# Patient Record
Sex: FEMALE | Race: OTHER | NOT HISPANIC OR LATINO
[De-identification: names, ages, dates, MRNs, and addresses within clinical notes are randomized per-mention and may not be internally consistent; named-entity substitution may affect disease eponyms.]

---

## 2018-01-26 ENCOUNTER — LABORATORY RESULT (OUTPATIENT)
Age: 33
End: 2018-01-26

## 2018-01-26 ENCOUNTER — APPOINTMENT (OUTPATIENT)
Dept: OBGYN | Facility: CLINIC | Age: 33
End: 2018-01-26
Payer: MEDICAID

## 2018-01-26 ENCOUNTER — NON-APPOINTMENT (OUTPATIENT)
Age: 33
End: 2018-01-26

## 2018-01-26 ENCOUNTER — OUTPATIENT (OUTPATIENT)
Dept: OUTPATIENT SERVICES | Facility: HOSPITAL | Age: 33
LOS: 1 days | End: 2018-01-26

## 2018-01-26 VITALS
WEIGHT: 170 LBS | SYSTOLIC BLOOD PRESSURE: 104 MMHG | HEIGHT: 66 IN | BODY MASS INDEX: 27.32 KG/M2 | DIASTOLIC BLOOD PRESSURE: 68 MMHG

## 2018-01-26 DIAGNOSIS — N76.0 ACUTE VAGINITIS: ICD-10-CM

## 2018-01-26 PROCEDURE — 76817 TRANSVAGINAL US OBSTETRIC: CPT | Mod: 26

## 2018-01-26 PROCEDURE — 99213 OFFICE O/P EST LOW 20 MIN: CPT | Mod: 25

## 2018-01-27 LAB
C TRACH RRNA SPEC QL NAA+PROBE: SIGNIFICANT CHANGE UP
HBV SURFACE AG SER-ACNC: SIGNIFICANT CHANGE UP
HCT VFR BLD CALC: 33.3 % — LOW (ref 34.5–45)
HGB BLD-MCNC: 10.6 G/DL — LOW (ref 11.5–15.5)
HIV 1+2 AB+HIV1 P24 AG SERPL QL IA: SIGNIFICANT CHANGE UP
HPV HIGH+LOW RISK DNA PNL CVX: SIGNIFICANT CHANGE UP
MCHC RBC-ENTMCNC: 27.5 PG — SIGNIFICANT CHANGE UP (ref 27–34)
MCHC RBC-ENTMCNC: 31.8 GM/DL — LOW (ref 32–36)
MCV RBC AUTO: 86.5 FL — SIGNIFICANT CHANGE UP (ref 80–100)
N GONORRHOEA RRNA SPEC QL NAA+PROBE: SIGNIFICANT CHANGE UP
PLATELET # BLD AUTO: 275 K/UL — SIGNIFICANT CHANGE UP (ref 150–400)
RBC # BLD: 3.85 M/UL — SIGNIFICANT CHANGE UP (ref 3.8–5.2)
RBC # FLD: 15.4 % — HIGH (ref 10.3–14.5)
RUBV IGG SER-ACNC: 2.6 INDEX — SIGNIFICANT CHANGE UP
RUBV IGG SER-IMP: POSITIVE — SIGNIFICANT CHANGE UP
SPECIMEN SOURCE: SIGNIFICANT CHANGE UP
T PALLIDUM AB TITR SER: NEGATIVE — SIGNIFICANT CHANGE UP
TSH SERPL-MCNC: 8.43 UIU/ML — HIGH (ref 0.27–4.2)
WBC # BLD: 10.11 K/UL — SIGNIFICANT CHANGE UP (ref 3.8–10.5)
WBC # FLD AUTO: 10.11 K/UL — SIGNIFICANT CHANGE UP (ref 3.8–10.5)

## 2018-01-28 LAB
CULTURE RESULTS: SIGNIFICANT CHANGE UP
M TB TUBERC IFN-G BLD QL: 0 IU/ML — SIGNIFICANT CHANGE UP
M TB TUBERC IFN-G BLD QL: 0.05 IU/ML — SIGNIFICANT CHANGE UP
M TB TUBERC IFN-G BLD QL: NEGATIVE — SIGNIFICANT CHANGE UP
MITOGEN IGNF BCKGRD COR BLD-ACNC: >10 IU/ML — SIGNIFICANT CHANGE UP
SPECIMEN SOURCE: SIGNIFICANT CHANGE UP

## 2018-01-29 LAB
T4 AB SER-ACNC: 9.1 UG/DL — SIGNIFICANT CHANGE UP (ref 4.6–12)
T4 FREE SERPL-MCNC: 1.1 NG/DL — SIGNIFICANT CHANGE UP (ref 0.9–1.8)

## 2018-01-30 ENCOUNTER — TRANSCRIPTION ENCOUNTER (OUTPATIENT)
Age: 33
End: 2018-01-30

## 2018-01-30 LAB
CYTOLOGY SPEC DOC CYTO: SIGNIFICANT CHANGE UP
LEAD BLD-MCNC: <1 UG/DL — SIGNIFICANT CHANGE UP (ref 0–19)

## 2018-02-07 DIAGNOSIS — Z34.90 ENCOUNTER FOR SUPERVISION OF NORMAL PREGNANCY, UNSPECIFIED, UNSPECIFIED TRIMESTER: ICD-10-CM

## 2018-02-12 ENCOUNTER — APPOINTMENT (OUTPATIENT)
Dept: ANTEPARTUM | Facility: CLINIC | Age: 33
End: 2018-02-12
Payer: MEDICAID

## 2018-02-12 ENCOUNTER — OTHER (OUTPATIENT)
Age: 33
End: 2018-02-12

## 2018-02-12 ENCOUNTER — ASOB RESULT (OUTPATIENT)
Age: 33
End: 2018-02-12

## 2018-02-12 PROCEDURE — 76817 TRANSVAGINAL US OBSTETRIC: CPT

## 2018-02-12 PROCEDURE — 83655 ASSAY OF LEAD: CPT

## 2018-02-12 PROCEDURE — 76801 OB US < 14 WKS SINGLE FETUS: CPT

## 2018-02-12 PROCEDURE — 85027 COMPLETE CBC AUTOMATED: CPT

## 2018-02-12 PROCEDURE — 87389 HIV-1 AG W/HIV-1&-2 AB AG IA: CPT

## 2018-02-12 PROCEDURE — 84436 ASSAY OF TOTAL THYROXINE: CPT

## 2018-02-12 PROCEDURE — 86780 TREPONEMA PALLIDUM: CPT

## 2018-02-12 PROCEDURE — 86850 RBC ANTIBODY SCREEN: CPT

## 2018-02-12 PROCEDURE — 84439 ASSAY OF FREE THYROXINE: CPT

## 2018-02-12 PROCEDURE — 82105 ALPHA-FETOPROTEIN SERUM: CPT

## 2018-02-12 PROCEDURE — 87340 HEPATITIS B SURFACE AG IA: CPT

## 2018-02-12 PROCEDURE — 86480 TB TEST CELL IMMUN MEASURE: CPT

## 2018-02-12 PROCEDURE — 86762 RUBELLA ANTIBODY: CPT

## 2018-02-12 PROCEDURE — G0463: CPT | Mod: 25

## 2018-02-12 PROCEDURE — 87086 URINE CULTURE/COLONY COUNT: CPT

## 2018-02-12 PROCEDURE — 86900 BLOOD TYPING SEROLOGIC ABO: CPT

## 2018-02-12 PROCEDURE — 87624 HPV HI-RISK TYP POOLED RSLT: CPT

## 2018-02-12 PROCEDURE — 84443 ASSAY THYROID STIM HORMONE: CPT

## 2018-02-27 ENCOUNTER — NON-APPOINTMENT (OUTPATIENT)
Age: 33
End: 2018-02-27

## 2018-02-27 ENCOUNTER — OUTPATIENT (OUTPATIENT)
Dept: OUTPATIENT SERVICES | Facility: HOSPITAL | Age: 33
LOS: 1 days | End: 2018-02-27

## 2018-02-27 ENCOUNTER — LABORATORY RESULT (OUTPATIENT)
Age: 33
End: 2018-02-27

## 2018-02-27 ENCOUNTER — APPOINTMENT (OUTPATIENT)
Dept: OBGYN | Facility: CLINIC | Age: 33
End: 2018-02-27
Payer: MEDICAID

## 2018-02-27 VITALS — SYSTOLIC BLOOD PRESSURE: 118 MMHG | BODY MASS INDEX: 28.08 KG/M2 | WEIGHT: 174 LBS | DIASTOLIC BLOOD PRESSURE: 78 MMHG

## 2018-02-27 DIAGNOSIS — Z34.80 ENCOUNTER FOR SUPERVISION OF OTHER NORMAL PREGNANCY, UNSPECIFIED TRIMESTER: ICD-10-CM

## 2018-02-27 PROCEDURE — 99213 OFFICE O/P EST LOW 20 MIN: CPT | Mod: NC

## 2018-02-27 PROCEDURE — 81002 URINALYSIS NONAUTO W/O SCOPE: CPT | Mod: NC

## 2018-02-27 RX ORDER — FOLIC ACID/MULTIVIT,IRON,MINER 0.4MG-18MG
200 TABLET ORAL
Qty: 30 | Refills: 6 | Status: ACTIVE | COMMUNITY
Start: 2018-02-27 | End: 1900-01-01

## 2018-03-01 LAB
2ND TRIMESTER DATA: SIGNIFICANT CHANGE UP
AFP SERPL-ACNC: SIGNIFICANT CHANGE UP
B-HCG FREE SERPL-MCNC: SIGNIFICANT CHANGE UP
CLINICAL BIOCHEMIST REVIEW: SIGNIFICANT CHANGE UP
DEMOGRAPHIC DATA: SIGNIFICANT CHANGE UP
INHIBIN A SERPL-MCNC: SIGNIFICANT CHANGE UP
SCREEN-FOOTER: SIGNIFICANT CHANGE UP
SCREEN-RECOMMENDATIONS: SIGNIFICANT CHANGE UP
U ESTRIOL SERPL-SCNC: SIGNIFICANT CHANGE UP

## 2018-03-27 ENCOUNTER — NON-APPOINTMENT (OUTPATIENT)
Age: 33
End: 2018-03-27

## 2018-03-27 ENCOUNTER — OUTPATIENT (OUTPATIENT)
Dept: OUTPATIENT SERVICES | Facility: HOSPITAL | Age: 33
LOS: 1 days | End: 2018-03-27
Payer: MEDICAID

## 2018-03-27 ENCOUNTER — APPOINTMENT (OUTPATIENT)
Dept: OBGYN | Facility: CLINIC | Age: 33
End: 2018-03-27
Payer: MEDICAID

## 2018-03-27 VITALS — WEIGHT: 178 LBS | DIASTOLIC BLOOD PRESSURE: 74 MMHG | BODY MASS INDEX: 28.73 KG/M2 | SYSTOLIC BLOOD PRESSURE: 112 MMHG

## 2018-03-27 DIAGNOSIS — Z34.80 ENCOUNTER FOR SUPERVISION OF OTHER NORMAL PREGNANCY, UNSPECIFIED TRIMESTER: ICD-10-CM

## 2018-03-27 DIAGNOSIS — Z34.90 ENCOUNTER FOR SUPERVISION OF NORMAL PREGNANCY, UNSPECIFIED, UNSPECIFIED TRIMESTER: ICD-10-CM

## 2018-03-27 DIAGNOSIS — Z34.91 ENCOUNTER FOR SUPERVISION OF NORMAL PREGNANCY, UNSPECIFIED, FIRST TRIMESTER: ICD-10-CM

## 2018-03-27 PROCEDURE — 81002 URINALYSIS NONAUTO W/O SCOPE: CPT | Mod: NC

## 2018-03-27 PROCEDURE — 99213 OFFICE O/P EST LOW 20 MIN: CPT | Mod: NC

## 2018-03-27 PROCEDURE — 81002 URINALYSIS NONAUTO W/O SCOPE: CPT

## 2018-03-27 PROCEDURE — G0463: CPT

## 2018-03-28 ENCOUNTER — APPOINTMENT (OUTPATIENT)
Dept: ANTEPARTUM | Facility: CLINIC | Age: 33
End: 2018-03-28
Payer: MEDICAID

## 2018-03-28 ENCOUNTER — NON-APPOINTMENT (OUTPATIENT)
Age: 33
End: 2018-03-28

## 2018-03-28 ENCOUNTER — ASOB RESULT (OUTPATIENT)
Age: 33
End: 2018-03-28

## 2018-03-28 PROBLEM — Z34.91 ENCOUNTER FOR SUPERVISION OF NORMAL PREGNANCY IN FIRST TRIMESTER, UNSPECIFIED GRAVIDITY: Status: RESOLVED | Noted: 2018-01-26 | Resolved: 2018-03-28

## 2018-03-28 PROCEDURE — 76805 OB US >/= 14 WKS SNGL FETUS: CPT

## 2018-03-28 PROCEDURE — 76817 TRANSVAGINAL US OBSTETRIC: CPT

## 2018-04-03 DIAGNOSIS — Z34.82 ENCOUNTER FOR SUPERVISION OF OTHER NORMAL PREGNANCY, SECOND TRIMESTER: ICD-10-CM

## 2018-04-09 ENCOUNTER — ASOB RESULT (OUTPATIENT)
Age: 33
End: 2018-04-09

## 2018-04-09 ENCOUNTER — APPOINTMENT (OUTPATIENT)
Dept: ANTEPARTUM | Facility: CLINIC | Age: 33
End: 2018-04-09
Payer: MEDICAID

## 2018-04-09 PROCEDURE — 76816 OB US FOLLOW-UP PER FETUS: CPT

## 2018-04-24 ENCOUNTER — APPOINTMENT (OUTPATIENT)
Dept: OBGYN | Facility: CLINIC | Age: 33
End: 2018-04-24
Payer: MEDICAID

## 2018-04-24 ENCOUNTER — NON-APPOINTMENT (OUTPATIENT)
Age: 33
End: 2018-04-24

## 2018-04-24 ENCOUNTER — OUTPATIENT (OUTPATIENT)
Dept: OUTPATIENT SERVICES | Facility: HOSPITAL | Age: 33
LOS: 1 days | End: 2018-04-24
Payer: MEDICAID

## 2018-04-24 VITALS — SYSTOLIC BLOOD PRESSURE: 120 MMHG | WEIGHT: 182 LBS | BODY MASS INDEX: 29.38 KG/M2 | DIASTOLIC BLOOD PRESSURE: 76 MMHG

## 2018-04-24 DIAGNOSIS — Z34.80 ENCOUNTER FOR SUPERVISION OF OTHER NORMAL PREGNANCY, UNSPECIFIED TRIMESTER: ICD-10-CM

## 2018-04-24 PROCEDURE — G0463: CPT

## 2018-04-24 PROCEDURE — 99213 OFFICE O/P EST LOW 20 MIN: CPT | Mod: NC

## 2018-04-24 PROCEDURE — 81002 URINALYSIS NONAUTO W/O SCOPE: CPT

## 2018-04-24 PROCEDURE — 81002 URINALYSIS NONAUTO W/O SCOPE: CPT | Mod: NC

## 2018-05-01 DIAGNOSIS — Z34.92 ENCOUNTER FOR SUPERVISION OF NORMAL PREGNANCY, UNSPECIFIED, SECOND TRIMESTER: ICD-10-CM

## 2018-05-23 ENCOUNTER — LABORATORY RESULT (OUTPATIENT)
Age: 33
End: 2018-05-23

## 2018-05-23 ENCOUNTER — OUTPATIENT (OUTPATIENT)
Dept: OUTPATIENT SERVICES | Facility: HOSPITAL | Age: 33
LOS: 1 days | End: 2018-05-23
Payer: MEDICAID

## 2018-05-23 ENCOUNTER — APPOINTMENT (OUTPATIENT)
Dept: OBGYN | Facility: CLINIC | Age: 33
End: 2018-05-23
Payer: MEDICAID

## 2018-05-23 VITALS
WEIGHT: 187 LBS | BODY MASS INDEX: 30.05 KG/M2 | HEIGHT: 66 IN | DIASTOLIC BLOOD PRESSURE: 70 MMHG | SYSTOLIC BLOOD PRESSURE: 112 MMHG

## 2018-05-23 DIAGNOSIS — Z34.80 ENCOUNTER FOR SUPERVISION OF OTHER NORMAL PREGNANCY, UNSPECIFIED TRIMESTER: ICD-10-CM

## 2018-05-23 DIAGNOSIS — Z34.93 ENCOUNTER FOR SUPERVISION OF NORMAL PREGNANCY, UNSPECIFIED, THIRD TRIMESTER: ICD-10-CM

## 2018-05-23 DIAGNOSIS — Z30.011 ENCOUNTER FOR INITIAL PRESCRIPTION OF CONTRACEPTIVE PILLS: ICD-10-CM

## 2018-05-23 LAB
HCT VFR BLD CALC: 31.7 % — LOW (ref 34.5–45)
HGB BLD-MCNC: 10.4 G/DL — LOW (ref 11.5–15.5)
MCHC RBC-ENTMCNC: 29.9 PG — SIGNIFICANT CHANGE UP (ref 27–34)
MCHC RBC-ENTMCNC: 32.8 GM/DL — SIGNIFICANT CHANGE UP (ref 32–36)
MCV RBC AUTO: 91.1 FL — SIGNIFICANT CHANGE UP (ref 80–100)
PLATELET # BLD AUTO: 269 K/UL — SIGNIFICANT CHANGE UP (ref 150–400)
RBC # BLD: 3.48 M/UL — LOW (ref 3.8–5.2)
RBC # FLD: 14.6 % — HIGH (ref 10.3–14.5)
WBC # BLD: 11.95 K/UL — HIGH (ref 3.8–10.5)
WBC # FLD AUTO: 11.95 K/UL — HIGH (ref 3.8–10.5)

## 2018-05-23 PROCEDURE — 85027 COMPLETE CBC AUTOMATED: CPT

## 2018-05-23 PROCEDURE — G0463: CPT

## 2018-05-23 PROCEDURE — 81003 URINALYSIS AUTO W/O SCOPE: CPT

## 2018-05-23 PROCEDURE — 99213 OFFICE O/P EST LOW 20 MIN: CPT | Mod: NC

## 2018-05-23 PROCEDURE — 84436 ASSAY OF TOTAL THYROXINE: CPT

## 2018-05-23 PROCEDURE — 81003 URINALYSIS AUTO W/O SCOPE: CPT | Mod: NC,QW

## 2018-05-23 PROCEDURE — 87389 HIV-1 AG W/HIV-1&-2 AB AG IA: CPT

## 2018-05-23 PROCEDURE — 82950 GLUCOSE TEST: CPT

## 2018-05-23 PROCEDURE — 84443 ASSAY THYROID STIM HORMONE: CPT

## 2018-05-24 LAB
GLUCOSE 1H P MEAL SERPL-MCNC: 130 MG/DL — SIGNIFICANT CHANGE UP (ref 70–134)
HIV 1+2 AB+HIV1 P24 AG SERPL QL IA: SIGNIFICANT CHANGE UP
T4 AB SER-ACNC: 8.9 UG/DL — SIGNIFICANT CHANGE UP (ref 4.6–12)
TSH SERPL-MCNC: 4.84 UIU/ML — HIGH (ref 0.27–4.2)

## 2018-06-11 ENCOUNTER — APPOINTMENT (OUTPATIENT)
Dept: OBGYN | Facility: CLINIC | Age: 33
End: 2018-06-11
Payer: MEDICAID

## 2018-06-11 ENCOUNTER — OUTPATIENT (OUTPATIENT)
Dept: OUTPATIENT SERVICES | Facility: HOSPITAL | Age: 33
LOS: 1 days | End: 2018-06-11
Payer: MEDICAID

## 2018-06-11 ENCOUNTER — NON-APPOINTMENT (OUTPATIENT)
Age: 33
End: 2018-06-11

## 2018-06-11 VITALS
HEIGHT: 66 IN | WEIGHT: 190.13 LBS | DIASTOLIC BLOOD PRESSURE: 78 MMHG | BODY MASS INDEX: 30.56 KG/M2 | SYSTOLIC BLOOD PRESSURE: 118 MMHG

## 2018-06-11 DIAGNOSIS — Z34.80 ENCOUNTER FOR SUPERVISION OF OTHER NORMAL PREGNANCY, UNSPECIFIED TRIMESTER: ICD-10-CM

## 2018-06-11 DIAGNOSIS — Z34.93 ENCOUNTER FOR SUPERVISION OF NORMAL PREGNANCY, UNSPECIFIED, THIRD TRIMESTER: ICD-10-CM

## 2018-06-11 PROCEDURE — G0463: CPT

## 2018-06-11 PROCEDURE — 99213 OFFICE O/P EST LOW 20 MIN: CPT | Mod: GE

## 2018-06-13 ENCOUNTER — APPOINTMENT (OUTPATIENT)
Dept: OBGYN | Facility: CLINIC | Age: 33
End: 2018-06-13

## 2018-06-29 ENCOUNTER — APPOINTMENT (OUTPATIENT)
Dept: OBGYN | Facility: CLINIC | Age: 33
End: 2018-06-29
Payer: MEDICAID

## 2018-06-29 ENCOUNTER — NON-APPOINTMENT (OUTPATIENT)
Age: 33
End: 2018-06-29

## 2018-06-29 ENCOUNTER — OUTPATIENT (OUTPATIENT)
Dept: OUTPATIENT SERVICES | Facility: HOSPITAL | Age: 33
LOS: 1 days | End: 2018-06-29
Payer: MEDICAID

## 2018-06-29 VITALS — WEIGHT: 192 LBS | DIASTOLIC BLOOD PRESSURE: 80 MMHG | BODY MASS INDEX: 30.99 KG/M2 | SYSTOLIC BLOOD PRESSURE: 116 MMHG

## 2018-06-29 DIAGNOSIS — Z34.80 ENCOUNTER FOR SUPERVISION OF OTHER NORMAL PREGNANCY, UNSPECIFIED TRIMESTER: ICD-10-CM

## 2018-06-29 PROCEDURE — 90471 IMMUNIZATION ADMIN: CPT | Mod: NC

## 2018-06-29 PROCEDURE — 90471 IMMUNIZATION ADMIN: CPT

## 2018-06-29 PROCEDURE — 90715 TDAP VACCINE 7 YRS/> IM: CPT | Mod: NC

## 2018-06-29 PROCEDURE — 90715 TDAP VACCINE 7 YRS/> IM: CPT

## 2018-06-29 PROCEDURE — 99213 OFFICE O/P EST LOW 20 MIN: CPT | Mod: NC,25

## 2018-06-29 PROCEDURE — G0463: CPT | Mod: 25

## 2018-07-10 DIAGNOSIS — O99.013 ANEMIA COMPLICATING PREGNANCY, THIRD TRIMESTER: ICD-10-CM

## 2018-07-10 DIAGNOSIS — Z23 ENCOUNTER FOR IMMUNIZATION: ICD-10-CM

## 2018-07-13 ENCOUNTER — APPOINTMENT (OUTPATIENT)
Dept: OBGYN | Facility: CLINIC | Age: 33
End: 2018-07-13

## 2018-07-18 ENCOUNTER — OUTPATIENT (OUTPATIENT)
Dept: OUTPATIENT SERVICES | Facility: HOSPITAL | Age: 33
LOS: 1 days | End: 2018-07-18
Payer: MEDICAID

## 2018-07-18 ENCOUNTER — APPOINTMENT (OUTPATIENT)
Dept: OBGYN | Facility: CLINIC | Age: 33
End: 2018-07-18
Payer: MEDICAID

## 2018-07-18 ENCOUNTER — LABORATORY RESULT (OUTPATIENT)
Age: 33
End: 2018-07-18

## 2018-07-18 VITALS
WEIGHT: 197.13 LBS | HEIGHT: 66 IN | DIASTOLIC BLOOD PRESSURE: 80 MMHG | BODY MASS INDEX: 31.68 KG/M2 | SYSTOLIC BLOOD PRESSURE: 118 MMHG

## 2018-07-18 DIAGNOSIS — Z34.80 ENCOUNTER FOR SUPERVISION OF OTHER NORMAL PREGNANCY, UNSPECIFIED TRIMESTER: ICD-10-CM

## 2018-07-18 DIAGNOSIS — Z34.93 ENCOUNTER FOR SUPERVISION OF NORMAL PREGNANCY, UNSPECIFIED, THIRD TRIMESTER: ICD-10-CM

## 2018-07-18 PROCEDURE — 99213 OFFICE O/P EST LOW 20 MIN: CPT | Mod: NC

## 2018-07-18 PROCEDURE — 81003 URINALYSIS AUTO W/O SCOPE: CPT

## 2018-07-18 PROCEDURE — 81003 URINALYSIS AUTO W/O SCOPE: CPT | Mod: NC,QW

## 2018-07-18 PROCEDURE — G0463: CPT

## 2018-07-18 PROCEDURE — 87653 STREP B DNA AMP PROBE: CPT

## 2018-07-19 ENCOUNTER — OUTPATIENT (OUTPATIENT)
Dept: OUTPATIENT SERVICES | Facility: HOSPITAL | Age: 33
LOS: 1 days | End: 2018-07-19
Payer: MEDICAID

## 2018-07-19 DIAGNOSIS — Z01.818 ENCOUNTER FOR OTHER PREPROCEDURAL EXAMINATION: ICD-10-CM

## 2018-07-19 DIAGNOSIS — Z98.890 OTHER SPECIFIED POSTPROCEDURAL STATES: ICD-10-CM

## 2018-07-19 LAB
BLD GP AB SCN SERPL QL: NEGATIVE — SIGNIFICANT CHANGE UP
HCT VFR BLD CALC: 33.4 % — LOW (ref 34.5–45)
HGB BLD-MCNC: 10.8 G/DL — LOW (ref 11.5–15.5)
MCHC RBC-ENTMCNC: 29.2 PG — SIGNIFICANT CHANGE UP (ref 27–34)
MCHC RBC-ENTMCNC: 32.3 GM/DL — SIGNIFICANT CHANGE UP (ref 32–36)
MCV RBC AUTO: 90.3 FL — SIGNIFICANT CHANGE UP (ref 80–100)
PLATELET # BLD AUTO: 230 K/UL — SIGNIFICANT CHANGE UP (ref 150–400)
RBC # BLD: 3.7 M/UL — LOW (ref 3.8–5.2)
RBC # FLD: 14 % — SIGNIFICANT CHANGE UP (ref 10.3–14.5)
RH IG SCN BLD-IMP: POSITIVE — SIGNIFICANT CHANGE UP
WBC # BLD: 9.43 K/UL — SIGNIFICANT CHANGE UP (ref 3.8–10.5)
WBC # FLD AUTO: 9.43 K/UL — SIGNIFICANT CHANGE UP (ref 3.8–10.5)

## 2018-07-19 PROCEDURE — 86850 RBC ANTIBODY SCREEN: CPT

## 2018-07-19 PROCEDURE — 86901 BLOOD TYPING SEROLOGIC RH(D): CPT

## 2018-07-19 PROCEDURE — 86900 BLOOD TYPING SEROLOGIC ABO: CPT

## 2018-07-19 PROCEDURE — G0463: CPT

## 2018-07-19 PROCEDURE — 85027 COMPLETE CBC AUTOMATED: CPT

## 2018-07-19 RX ORDER — METOCLOPRAMIDE HCL 10 MG
10 TABLET ORAL ONCE
Qty: 0 | Refills: 0 | Status: DISCONTINUED | OUTPATIENT
Start: 2018-07-27 | End: 2018-07-30

## 2018-07-19 RX ORDER — SODIUM CHLORIDE 9 MG/ML
1000 INJECTION, SOLUTION INTRAVENOUS
Qty: 0 | Refills: 0 | Status: DISCONTINUED | OUTPATIENT
Start: 2018-07-27 | End: 2018-07-30

## 2018-07-19 RX ORDER — SODIUM CHLORIDE 9 MG/ML
1000 INJECTION, SOLUTION INTRAVENOUS ONCE
Qty: 0 | Refills: 0 | Status: DISCONTINUED | OUTPATIENT
Start: 2018-07-27 | End: 2018-07-30

## 2018-07-20 LAB
GROUP B BETA STREP DNA (PCR): SIGNIFICANT CHANGE UP
GROUP B BETA STREP INTERPRETATION: SIGNIFICANT CHANGE UP
SOURCE GROUP B STREP: SIGNIFICANT CHANGE UP

## 2018-07-26 ENCOUNTER — TRANSCRIPTION ENCOUNTER (OUTPATIENT)
Age: 33
End: 2018-07-26

## 2018-07-27 ENCOUNTER — INPATIENT (INPATIENT)
Facility: HOSPITAL | Age: 33
LOS: 2 days | Discharge: HOME | End: 2018-07-30
Attending: OBSTETRICS & GYNECOLOGY | Admitting: OBSTETRICS & GYNECOLOGY
Payer: MEDICAID

## 2018-07-27 ENCOUNTER — APPOINTMENT (OUTPATIENT)
Dept: OBGYN | Facility: CLINIC | Age: 33
End: 2018-07-27

## 2018-07-27 VITALS — WEIGHT: 196.21 LBS | HEIGHT: 65 IN

## 2018-07-27 DIAGNOSIS — Z98.89 OTHER SPECIFIED POSTPROCEDURAL STATES: ICD-10-CM

## 2018-07-27 DIAGNOSIS — Z98.890 OTHER SPECIFIED POSTPROCEDURAL STATES: ICD-10-CM

## 2018-07-27 LAB
BLD GP AB SCN SERPL QL: NEGATIVE — SIGNIFICANT CHANGE UP
RH IG SCN BLD-IMP: POSITIVE — SIGNIFICANT CHANGE UP
T PALLIDUM AB TITR SER: NEGATIVE — SIGNIFICANT CHANGE UP

## 2018-07-27 PROCEDURE — 59514 CESAREAN DELIVERY ONLY: CPT | Mod: U9

## 2018-07-27 RX ORDER — FAMOTIDINE 10 MG/ML
20 INJECTION INTRAVENOUS ONCE
Qty: 0 | Refills: 0 | Status: COMPLETED | OUTPATIENT
Start: 2018-07-27 | End: 2018-07-27

## 2018-07-27 RX ORDER — OXYTOCIN 10 UNIT/ML
41.67 VIAL (ML) INJECTION
Qty: 20 | Refills: 0 | Status: DISCONTINUED | OUTPATIENT
Start: 2018-07-27 | End: 2018-07-27

## 2018-07-27 RX ORDER — OXYTOCIN 10 UNIT/ML
333.33 VIAL (ML) INJECTION
Qty: 20 | Refills: 0 | Status: DISCONTINUED | OUTPATIENT
Start: 2018-07-27 | End: 2018-07-30

## 2018-07-27 RX ORDER — OXYTOCIN 10 UNIT/ML
41.67 VIAL (ML) INJECTION
Qty: 20 | Refills: 0 | Status: DISCONTINUED | OUTPATIENT
Start: 2018-07-29 | End: 2018-07-30

## 2018-07-27 RX ORDER — SODIUM CHLORIDE 9 MG/ML
1000 INJECTION, SOLUTION INTRAVENOUS
Qty: 0 | Refills: 0 | Status: DISCONTINUED | OUTPATIENT
Start: 2018-07-29 | End: 2018-07-30

## 2018-07-27 RX ORDER — FERROUS SULFATE 325(65) MG
325 TABLET ORAL DAILY
Qty: 0 | Refills: 0 | Status: DISCONTINUED | OUTPATIENT
Start: 2018-07-29 | End: 2018-07-30

## 2018-07-27 RX ORDER — SIMETHICONE 80 MG/1
80 TABLET, CHEWABLE ORAL EVERY 4 HOURS
Qty: 0 | Refills: 0 | Status: DISCONTINUED | OUTPATIENT
Start: 2018-07-27 | End: 2018-07-30

## 2018-07-27 RX ORDER — OXYCODONE HYDROCHLORIDE 5 MG/1
5 TABLET ORAL EVERY 4 HOURS
Qty: 0 | Refills: 0 | Status: COMPLETED | OUTPATIENT
Start: 2018-07-29 | End: 2018-08-05

## 2018-07-27 RX ORDER — CEFAZOLIN SODIUM 1 G
2000 VIAL (EA) INJECTION ONCE
Qty: 0 | Refills: 0 | Status: COMPLETED | OUTPATIENT
Start: 2018-07-27 | End: 2018-07-27

## 2018-07-27 RX ORDER — KETOROLAC TROMETHAMINE 30 MG/ML
30 SYRINGE (ML) INJECTION EVERY 6 HOURS
Qty: 0 | Refills: 0 | Status: DISCONTINUED | OUTPATIENT
Start: 2018-07-27 | End: 2018-07-29

## 2018-07-27 RX ORDER — GLYCERIN ADULT
1 SUPPOSITORY, RECTAL RECTAL AT BEDTIME
Qty: 0 | Refills: 0 | Status: DISCONTINUED | OUTPATIENT
Start: 2018-07-29 | End: 2018-07-30

## 2018-07-27 RX ORDER — OXYTOCIN 10 UNIT/ML
333.33 VIAL (ML) INJECTION
Qty: 20 | Refills: 0 | Status: DISCONTINUED | OUTPATIENT
Start: 2018-07-28 | End: 2018-07-30

## 2018-07-27 RX ORDER — ACETAMINOPHEN 500 MG
975 TABLET ORAL EVERY 6 HOURS
Qty: 0 | Refills: 0 | Status: DISCONTINUED | OUTPATIENT
Start: 2018-07-27 | End: 2018-07-30

## 2018-07-27 RX ORDER — OXYCODONE HYDROCHLORIDE 5 MG/1
5 TABLET ORAL
Qty: 0 | Refills: 0 | Status: COMPLETED | OUTPATIENT
Start: 2018-07-29 | End: 2018-08-05

## 2018-07-27 RX ORDER — ONDANSETRON 8 MG/1
4 TABLET, FILM COATED ORAL EVERY 6 HOURS
Qty: 0 | Refills: 0 | Status: DISCONTINUED | OUTPATIENT
Start: 2018-07-27 | End: 2018-07-29

## 2018-07-27 RX ORDER — DEXAMETHASONE 0.5 MG/5ML
4 ELIXIR ORAL EVERY 6 HOURS
Qty: 0 | Refills: 0 | Status: DISCONTINUED | OUTPATIENT
Start: 2018-07-27 | End: 2018-07-29

## 2018-07-27 RX ORDER — IBUPROFEN 200 MG
600 TABLET ORAL EVERY 6 HOURS
Qty: 0 | Refills: 0 | Status: COMPLETED | OUTPATIENT
Start: 2018-07-29 | End: 2019-06-27

## 2018-07-27 RX ORDER — DIPHENHYDRAMINE HCL 50 MG
25 CAPSULE ORAL EVERY 6 HOURS
Qty: 0 | Refills: 0 | Status: DISCONTINUED | OUTPATIENT
Start: 2018-07-27 | End: 2018-07-30

## 2018-07-27 RX ORDER — LANOLIN
1 OINTMENT (GRAM) TOPICAL
Qty: 0 | Refills: 0 | Status: DISCONTINUED | OUTPATIENT
Start: 2018-07-29 | End: 2018-07-30

## 2018-07-27 RX ORDER — CITRIC ACID/SODIUM CITRATE 300-500 MG
15 SOLUTION, ORAL ORAL ONCE
Qty: 0 | Refills: 0 | Status: COMPLETED | OUTPATIENT
Start: 2018-07-27 | End: 2018-07-27

## 2018-07-27 RX ORDER — SODIUM CHLORIDE 9 MG/ML
1000 INJECTION, SOLUTION INTRAVENOUS
Qty: 0 | Refills: 0 | Status: DISCONTINUED | OUTPATIENT
Start: 2018-07-28 | End: 2018-07-30

## 2018-07-27 RX ORDER — TETANUS TOXOID, REDUCED DIPHTHERIA TOXOID AND ACELLULAR PERTUSSIS VACCINE, ADSORBED 5; 2.5; 8; 8; 2.5 [IU]/.5ML; [IU]/.5ML; UG/.5ML; UG/.5ML; UG/.5ML
0.5 SUSPENSION INTRAMUSCULAR ONCE
Qty: 0 | Refills: 0 | Status: DISCONTINUED | OUTPATIENT
Start: 2018-07-29 | End: 2018-07-30

## 2018-07-27 RX ORDER — HEPARIN SODIUM 5000 [USP'U]/ML
5000 INJECTION INTRAVENOUS; SUBCUTANEOUS EVERY 12 HOURS
Qty: 0 | Refills: 0 | Status: DISCONTINUED | OUTPATIENT
Start: 2018-07-27 | End: 2018-07-30

## 2018-07-27 RX ORDER — OXYTOCIN 10 UNIT/ML
41.67 VIAL (ML) INJECTION
Qty: 20 | Refills: 0 | Status: DISCONTINUED | OUTPATIENT
Start: 2018-07-27 | End: 2018-07-30

## 2018-07-27 RX ORDER — DOCUSATE SODIUM 100 MG
100 CAPSULE ORAL
Qty: 0 | Refills: 0 | Status: DISCONTINUED | OUTPATIENT
Start: 2018-07-27 | End: 2018-07-30

## 2018-07-27 RX ORDER — NALOXONE HYDROCHLORIDE 4 MG/.1ML
0.1 SPRAY NASAL
Qty: 0 | Refills: 0 | Status: DISCONTINUED | OUTPATIENT
Start: 2018-07-27 | End: 2018-07-29

## 2018-07-27 RX ADMIN — HEPARIN SODIUM 5000 UNIT(S): 5000 INJECTION INTRAVENOUS; SUBCUTANEOUS at 22:29

## 2018-07-27 RX ADMIN — Medication 975 MILLIGRAM(S): at 18:27

## 2018-07-27 RX ADMIN — Medication 30 MILLIGRAM(S): at 15:25

## 2018-07-27 RX ADMIN — Medication 30 MILLIGRAM(S): at 23:30

## 2018-07-27 RX ADMIN — Medication 30 MILLIGRAM(S): at 22:29

## 2018-07-27 RX ADMIN — FAMOTIDINE 20 MILLIGRAM(S): 10 INJECTION INTRAVENOUS at 11:36

## 2018-07-27 RX ADMIN — Medication 0.2 MILLIGRAM(S): at 13:28

## 2018-07-27 RX ADMIN — Medication 100 MILLIGRAM(S): at 12:10

## 2018-07-27 RX ADMIN — Medication 15 MILLILITER(S): at 11:35

## 2018-07-28 LAB
HCT VFR BLD CALC: 25 % — LOW (ref 34.5–45)
HGB BLD-MCNC: 8.6 G/DL — LOW (ref 11.5–15.5)
MCHC RBC-ENTMCNC: 31 PG — SIGNIFICANT CHANGE UP (ref 27–34)
MCHC RBC-ENTMCNC: 34.2 GM/DL — SIGNIFICANT CHANGE UP (ref 32–36)
MCV RBC AUTO: 90.4 FL — SIGNIFICANT CHANGE UP (ref 80–100)
PLATELET # BLD AUTO: 169 K/UL — SIGNIFICANT CHANGE UP (ref 150–400)
RBC # BLD: 2.77 M/UL — LOW (ref 3.8–5.2)
RBC # FLD: 13 % — SIGNIFICANT CHANGE UP (ref 10.3–14.5)
WBC # BLD: 13.4 K/UL — HIGH (ref 3.8–10.5)
WBC # FLD AUTO: 13.4 K/UL — HIGH (ref 3.8–10.5)

## 2018-07-28 RX ORDER — MEDROXYPROGESTERONE ACETATE 150 MG/ML
150 INJECTION, SUSPENSION, EXTENDED RELEASE INTRAMUSCULAR ONCE
Qty: 0 | Refills: 0 | Status: COMPLETED | OUTPATIENT
Start: 2018-07-28 | End: 2018-07-28

## 2018-07-28 RX ADMIN — Medication 30 MILLIGRAM(S): at 04:00

## 2018-07-28 RX ADMIN — Medication 30 MILLIGRAM(S): at 10:22

## 2018-07-28 RX ADMIN — Medication 975 MILLIGRAM(S): at 22:45

## 2018-07-28 RX ADMIN — Medication 100 MILLIGRAM(S): at 22:01

## 2018-07-28 RX ADMIN — Medication 975 MILLIGRAM(S): at 00:29

## 2018-07-28 RX ADMIN — Medication 30 MILLIGRAM(S): at 16:19

## 2018-07-28 RX ADMIN — SODIUM CHLORIDE 125 MILLILITER(S): 9 INJECTION, SOLUTION INTRAVENOUS at 22:03

## 2018-07-28 RX ADMIN — Medication 30 MILLIGRAM(S): at 21:59

## 2018-07-28 RX ADMIN — Medication 1 TABLET(S): at 16:13

## 2018-07-28 RX ADMIN — Medication 975 MILLIGRAM(S): at 06:40

## 2018-07-28 RX ADMIN — HEPARIN SODIUM 5000 UNIT(S): 5000 INJECTION INTRAVENOUS; SUBCUTANEOUS at 10:22

## 2018-07-28 RX ADMIN — Medication 30 MILLIGRAM(S): at 16:23

## 2018-07-28 RX ADMIN — Medication 975 MILLIGRAM(S): at 01:30

## 2018-07-28 RX ADMIN — Medication 30 MILLIGRAM(S): at 03:02

## 2018-07-28 RX ADMIN — HEPARIN SODIUM 5000 UNIT(S): 5000 INJECTION INTRAVENOUS; SUBCUTANEOUS at 21:59

## 2018-07-28 RX ADMIN — Medication 975 MILLIGRAM(S): at 05:38

## 2018-07-28 RX ADMIN — Medication 975 MILLIGRAM(S): at 22:01

## 2018-07-28 RX ADMIN — Medication 30 MILLIGRAM(S): at 22:45

## 2018-07-28 RX ADMIN — Medication 975 MILLIGRAM(S): at 16:12

## 2018-07-28 RX ADMIN — SIMETHICONE 80 MILLIGRAM(S): 80 TABLET, CHEWABLE ORAL at 22:01

## 2018-07-28 RX ADMIN — MEDROXYPROGESTERONE ACETATE 150 MILLIGRAM(S): 150 INJECTION, SUSPENSION, EXTENDED RELEASE INTRAMUSCULAR at 19:04

## 2018-07-28 NOTE — PROGRESS NOTE ADULT - ATTENDING COMMENTS
Patient seen and examined at bedside. No acute events overnight. Patient denies CP, SOB, Dizziness. incision c/d/i. Will obtain AM CBC tmrw, [id number] during c/.s. Patient currently stable. all questions answered.

## 2018-07-28 NOTE — PROGRESS NOTE ADULT - PROBLEM SELECTOR PLAN 1
- Continue regular diet.  - Increase ambulation.  - Continue motrin, tylenol, oxycodone PRN for pain control.    Salome Galvez PGY-1

## 2018-07-29 ENCOUNTER — TRANSCRIPTION ENCOUNTER (OUTPATIENT)
Age: 33
End: 2018-07-29

## 2018-07-29 LAB
HCT VFR BLD CALC: 27.1 % — LOW (ref 34.5–45)
HGB BLD-MCNC: 9 G/DL — LOW (ref 11.5–15.5)
MCHC RBC-ENTMCNC: 30.4 PG — SIGNIFICANT CHANGE UP (ref 27–34)
MCHC RBC-ENTMCNC: 33.3 GM/DL — SIGNIFICANT CHANGE UP (ref 32–36)
MCV RBC AUTO: 91.4 FL — SIGNIFICANT CHANGE UP (ref 80–100)
PLATELET # BLD AUTO: 194 K/UL — SIGNIFICANT CHANGE UP (ref 150–400)
RBC # BLD: 2.97 M/UL — LOW (ref 3.8–5.2)
RBC # FLD: 13.5 % — SIGNIFICANT CHANGE UP (ref 10.3–14.5)
WBC # BLD: 13.5 K/UL — HIGH (ref 3.8–10.5)
WBC # FLD AUTO: 13.5 K/UL — HIGH (ref 3.8–10.5)

## 2018-07-29 RX ORDER — OXYCODONE HYDROCHLORIDE 5 MG/1
1 TABLET ORAL
Qty: 20 | Refills: 0 | OUTPATIENT
Start: 2018-07-29

## 2018-07-29 RX ORDER — IBUPROFEN 200 MG
1 TABLET ORAL
Qty: 0 | Refills: 0 | COMMUNITY
Start: 2018-07-29

## 2018-07-29 RX ORDER — OXYCODONE HYDROCHLORIDE 5 MG/1
5 TABLET ORAL EVERY 4 HOURS
Qty: 0 | Refills: 0 | Status: DISCONTINUED | OUTPATIENT
Start: 2018-07-29 | End: 2018-07-30

## 2018-07-29 RX ORDER — ACETAMINOPHEN 500 MG
3 TABLET ORAL
Qty: 0 | Refills: 0 | COMMUNITY
Start: 2018-07-29

## 2018-07-29 RX ORDER — OXYCODONE HYDROCHLORIDE 5 MG/1
5 TABLET ORAL
Qty: 0 | Refills: 0 | Status: DISCONTINUED | OUTPATIENT
Start: 2018-07-29 | End: 2018-07-30

## 2018-07-29 RX ORDER — IBUPROFEN 200 MG
600 TABLET ORAL EVERY 6 HOURS
Qty: 0 | Refills: 0 | Status: DISCONTINUED | OUTPATIENT
Start: 2018-07-29 | End: 2018-07-30

## 2018-07-29 RX ADMIN — Medication 975 MILLIGRAM(S): at 05:37

## 2018-07-29 RX ADMIN — Medication 30 MILLIGRAM(S): at 05:39

## 2018-07-29 RX ADMIN — Medication 975 MILLIGRAM(S): at 12:21

## 2018-07-29 RX ADMIN — OXYCODONE HYDROCHLORIDE 5 MILLIGRAM(S): 5 TABLET ORAL at 15:29

## 2018-07-29 RX ADMIN — Medication 325 MILLIGRAM(S): at 11:48

## 2018-07-29 RX ADMIN — OXYCODONE HYDROCHLORIDE 5 MILLIGRAM(S): 5 TABLET ORAL at 17:34

## 2018-07-29 RX ADMIN — Medication 975 MILLIGRAM(S): at 17:35

## 2018-07-29 RX ADMIN — OXYCODONE HYDROCHLORIDE 5 MILLIGRAM(S): 5 TABLET ORAL at 09:30

## 2018-07-29 RX ADMIN — Medication 975 MILLIGRAM(S): at 06:37

## 2018-07-29 RX ADMIN — Medication 600 MILLIGRAM(S): at 11:48

## 2018-07-29 RX ADMIN — OXYCODONE HYDROCHLORIDE 5 MILLIGRAM(S): 5 TABLET ORAL at 12:21

## 2018-07-29 RX ADMIN — Medication 1 TABLET(S): at 11:48

## 2018-07-29 RX ADMIN — Medication 30 MILLIGRAM(S): at 06:37

## 2018-07-29 RX ADMIN — Medication 600 MILLIGRAM(S): at 12:21

## 2018-07-29 RX ADMIN — OXYCODONE HYDROCHLORIDE 5 MILLIGRAM(S): 5 TABLET ORAL at 22:00

## 2018-07-29 RX ADMIN — Medication 600 MILLIGRAM(S): at 17:35

## 2018-07-29 RX ADMIN — Medication 975 MILLIGRAM(S): at 11:48

## 2018-07-29 RX ADMIN — HEPARIN SODIUM 5000 UNIT(S): 5000 INJECTION INTRAVENOUS; SUBCUTANEOUS at 08:59

## 2018-07-29 RX ADMIN — OXYCODONE HYDROCHLORIDE 5 MILLIGRAM(S): 5 TABLET ORAL at 16:00

## 2018-07-29 RX ADMIN — OXYCODONE HYDROCHLORIDE 5 MILLIGRAM(S): 5 TABLET ORAL at 11:49

## 2018-07-29 RX ADMIN — OXYCODONE HYDROCHLORIDE 5 MILLIGRAM(S): 5 TABLET ORAL at 09:00

## 2018-07-29 RX ADMIN — OXYCODONE HYDROCHLORIDE 5 MILLIGRAM(S): 5 TABLET ORAL at 21:03

## 2018-07-29 RX ADMIN — HEPARIN SODIUM 5000 UNIT(S): 5000 INJECTION INTRAVENOUS; SUBCUTANEOUS at 21:04

## 2018-07-29 NOTE — DISCHARGE NOTE OB - HOSPITAL COURSE
Patient had uncomplicated repeat low transverse  section.  Please see operative note for details.  During postpartum course patient's vitals were stable, vaginal bleeding appropriate, and pain well controlled.  Post operation day one hematocrit was appropriate.  On day of discharge patient was ambulating, her pain controlled with oral medications, had adequate oral intake, and was voiding freely.  Discharge instructions and precautions were given.  Will return to clinic in 2 weeks for incision check.  Postpartum birth control plan is Depoprovera.

## 2018-07-29 NOTE — DISCHARGE NOTE OB - PATIENT PORTAL LINK FT
You can access the [facility], offered by [facility], by registering with the following website: [email or web address]

## 2018-07-29 NOTE — DISCHARGE NOTE OB - MATERIALS PROVIDED
Immunization Record/Bottle Feeding Log/Birth Certificate Instructions/Vaccinations/Shaken Baby Prevention Handout/Breastfeeding Guide and Packet/[facility]  Screening Program/[facility] Hearing Screen Program/Breastfeeding Mother’s Support Group Information/Guide to Postpartum Care/Discharge Medication Information for Patients and Families Pocket Guide

## 2018-07-29 NOTE — DISCHARGE NOTE OB - PLAN OF CARE
Recovery After discharge, please stay on pelvic rest for 6 weeks, meaning no sexual intercourse, no tampons and no douching.  No driving for 2 weeks as women can loose a lot of blood during delivery and there is a possibility of being lightheaded/fainting.  No lifting objects heavier than baby for two weeks.  Expect to have vaginal bleeding/spotting for up to six weeks.  The bleeding should get lighter and more white/light brown with time.  For bleeding soaking more than a pad an hour or passing clots greater than the size of your fist, come in to the emergency department.    Follow up in clinic in 2 weeks for incision check.  Call clinic for noticeable increase in redness or swelling at incision, discharge from incision, or opening of skin at incision site.

## 2018-07-29 NOTE — DISCHARGE NOTE OB - MEDICATION SUMMARY - MEDICATIONS TO TAKE
I will START or STAY ON the medications listed below when I get home from the hospital:    acetaminophen 325 mg oral tablet  -- 3 tab(s) by mouth every 6 hours  -- Indication: For Pain    ibuprofen 600 mg oral tablet  -- 1 tab(s) by mouth every 6 hours  -- Indication: For Pain    oxyCODONE 5 mg oral tablet  -- 1 tab(s) by mouth every 6 hours, As Needed MDD:4   -- Indication: For Pain    Prenatal 1 Plus 1 oral tablet  -- 1 tab(s) by mouth once a day  -- Indication: For Postpartum I will START or STAY ON the medications listed below when I get home from the hospital:    acetaminophen 325 mg oral tablet  -- 3 tab(s) by mouth every 6 hours  -- Indication: For Pain    ibuprofen 600 mg oral tablet  -- 1 tab(s) by mouth every 6 hours  -- Indication: For Pain    Prenatal 1 Plus 1 oral tablet  -- 1 tab(s) by mouth once a day  -- Indication: For Postpartum    FeroSul 325 mg (65 mg elemental iron) oral tablet  -- 1 tab(s) by mouth 3 times a day  -- Indication: For  delivery delivered    ascorbic acid 500 mg oral tablet  -- 1 tab(s) by mouth 2 times a day  -- Indication: For  delivery delivered

## 2018-07-29 NOTE — DISCHARGE NOTE OB - CARE PLAN
Principal Discharge DX:	 delivery delivered  Goal:	Recovery  Assessment and plan of treatment:	After discharge, please stay on pelvic rest for 6 weeks, meaning no sexual intercourse, no tampons and no douching.  No driving for 2 weeks as women can loose a lot of blood during delivery and there is a possibility of being lightheaded/fainting.  No lifting objects heavier than baby for two weeks.  Expect to have vaginal bleeding/spotting for up to six weeks.  The bleeding should get lighter and more white/light brown with time.  For bleeding soaking more than a pad an hour or passing clots greater than the size of your fist, come in to the emergency department.    Follow up in clinic in 2 weeks for incision check.  Call clinic for noticeable increase in redness or swelling at incision, discharge from incision, or opening of skin at incision site.

## 2018-07-29 NOTE — DISCHARGE NOTE OB - PROVIDER TOKENS
FREE:[LAST:[[facility]],PHONE:[phone]],FAX:[(   [phone]    -],ADDRESS:[[address] [place], [place] [id number]    Please call for an appointment.]]

## 2018-07-29 NOTE — DISCHARGE NOTE OB - CARE PROVIDER_API CALL
[facility],   [address] [place] [address], [address]  [place] [address]    Please call for an appointment.  Phone: [phone]  Fax: (   )    -

## 2018-07-29 NOTE — PROGRESS NOTE ADULT - ATTENDING COMMENTS
Patient doing well, no complaints, out of bed.   No HA, CP, SOB  No heavy vaginal bleeding (VB)/normal lochia    ICU Vital Signs Last 24 Hrs  T(C): 36.4 (30 Jul 2018 09:17), Max: 36.8 (29 Jul 2018 21:00)  HR: 82 (30 Jul 2018 09:17) (82 - 94)  BP: 119/83 (30 Jul 2018 09:17) (110/83 - 119/83)  RR: 18 (30 Jul 2018 09:17) (18 - 18)  SpO2: 99% (30 Jul 2018 09:17) (99% - 100%)    NAD  abd soft, appropriately -tender, nondistended  Ext: non-tender, minimal edema    POD # 3 stable for discharge  repeat C/S  Patient seen and evaluated by me. I agree with resident note unless otherwise stated.   Routine postpartum care, regular diet as tolerated, ambulate and pain control as needed.     XU Viveros MD  Attending

## 2018-07-30 VITALS
HEART RATE: 82 BPM | TEMPERATURE: 98 F | RESPIRATION RATE: 18 BRPM | DIASTOLIC BLOOD PRESSURE: 83 MMHG | OXYGEN SATURATION: 99 % | SYSTOLIC BLOOD PRESSURE: 119 MMHG

## 2018-07-30 LAB
HCT VFR BLD CALC: 26.1 % — LOW (ref 34.5–45)
HGB BLD-MCNC: 8.5 G/DL — LOW (ref 11.5–15.5)
MCHC RBC-ENTMCNC: 30.1 PG — SIGNIFICANT CHANGE UP (ref 27–34)
MCHC RBC-ENTMCNC: 32.7 GM/DL — SIGNIFICANT CHANGE UP (ref 32–36)
MCV RBC AUTO: 92.2 FL — SIGNIFICANT CHANGE UP (ref 80–100)
PLATELET # BLD AUTO: 218 K/UL — SIGNIFICANT CHANGE UP (ref 150–400)
RBC # BLD: 2.84 M/UL — LOW (ref 3.8–5.2)
RBC # FLD: 13.8 % — SIGNIFICANT CHANGE UP (ref 10.3–14.5)
WBC # BLD: 11.6 K/UL — HIGH (ref 3.8–10.5)
WBC # FLD AUTO: 11.6 K/UL — HIGH (ref 3.8–10.5)

## 2018-07-30 PROCEDURE — 86900 BLOOD TYPING SEROLOGIC ABO: CPT

## 2018-07-30 PROCEDURE — 86780 TREPONEMA PALLIDUM: CPT

## 2018-07-30 PROCEDURE — 86850 RBC ANTIBODY SCREEN: CPT

## 2018-07-30 PROCEDURE — 59025 FETAL NON-STRESS TEST: CPT

## 2018-07-30 PROCEDURE — 86901 BLOOD TYPING SEROLOGIC RH(D): CPT

## 2018-07-30 PROCEDURE — 59050 FETAL MONITOR W/REPORT: CPT

## 2018-07-30 PROCEDURE — 85027 COMPLETE CBC AUTOMATED: CPT

## 2018-07-30 RX ORDER — FERROUS SULFATE 325(65) MG
1 TABLET ORAL
Qty: 0 | Refills: 0 | COMMUNITY
Start: 2018-07-30

## 2018-07-30 RX ORDER — ASCORBIC ACID 60 MG
1 TABLET,CHEWABLE ORAL
Qty: 0 | Refills: 0 | COMMUNITY
Start: 2018-07-30

## 2018-07-30 RX ORDER — ASCORBIC ACID 60 MG
500 TABLET,CHEWABLE ORAL
Qty: 0 | Refills: 0 | Status: DISCONTINUED | OUTPATIENT
Start: 2018-07-30 | End: 2018-07-30

## 2018-07-30 RX ORDER — FERROUS SULFATE 325(65) MG
325 TABLET ORAL
Qty: 0 | Refills: 0 | Status: DISCONTINUED | OUTPATIENT
Start: 2018-07-30 | End: 2018-07-30

## 2018-07-30 RX ADMIN — OXYCODONE HYDROCHLORIDE 5 MILLIGRAM(S): 5 TABLET ORAL at 00:17

## 2018-07-30 RX ADMIN — Medication 325 MILLIGRAM(S): at 12:17

## 2018-07-30 RX ADMIN — Medication 600 MILLIGRAM(S): at 12:17

## 2018-07-30 RX ADMIN — OXYCODONE HYDROCHLORIDE 5 MILLIGRAM(S): 5 TABLET ORAL at 01:00

## 2018-07-30 RX ADMIN — OXYCODONE HYDROCHLORIDE 5 MILLIGRAM(S): 5 TABLET ORAL at 04:00

## 2018-07-30 RX ADMIN — Medication 975 MILLIGRAM(S): at 09:57

## 2018-07-30 RX ADMIN — Medication 600 MILLIGRAM(S): at 00:15

## 2018-07-30 RX ADMIN — Medication 600 MILLIGRAM(S): at 06:37

## 2018-07-30 RX ADMIN — OXYCODONE HYDROCHLORIDE 5 MILLIGRAM(S): 5 TABLET ORAL at 03:19

## 2018-07-30 RX ADMIN — Medication 100 MILLIGRAM(S): at 12:17

## 2018-07-30 RX ADMIN — Medication 975 MILLIGRAM(S): at 10:30

## 2018-07-30 RX ADMIN — Medication 600 MILLIGRAM(S): at 05:57

## 2018-07-30 RX ADMIN — Medication 600 MILLIGRAM(S): at 01:00

## 2018-07-30 RX ADMIN — Medication 975 MILLIGRAM(S): at 04:00

## 2018-07-30 RX ADMIN — Medication 975 MILLIGRAM(S): at 03:20

## 2018-07-30 RX ADMIN — Medication 1 TABLET(S): at 12:17

## 2018-07-30 NOTE — PROGRESS NOTE ADULT - ASSESSMENT
A/P: 31yo POD#3 s/p LTCS.  Patient is stable and is doing well post-operatively.  - Continue motrin, tylenol, oxycodone PRN for pain control.  - Increase ambulation  - Continue regular diet  - Discharge planning    Gustavo Bailey, PGY-1

## 2018-07-30 NOTE — CHART NOTE - NSCHARTNOTEFT_GEN_A_CORE
PA NOTE       POD#3    Vital Signs Last 24 Hrs  T(C): 36.4 (2018 09:17), Max: 36.8 (2018 21:00)  T(F): 97.5 (2018 09:17), Max: 98.3 (2018 21:00)  HR: 82 (2018 09:17) (82 - 94)  BP: 119/83 (2018 09:17) (110/83 - 119/83)  RR: 18 (2018 09:17) (18 - 18)  SpO2: 99% (2018 09:17) (99% - 100%)                          8.5    11.6  )-----------( 218      ( 2018 06:15 )             26.1           Plan:  - Ferrous Sulfate, Colace, Vitamin C supplementation.  Ramila Patel PA-C

## 2018-07-30 NOTE — PROGRESS NOTE ADULT - SUBJECTIVE AND OBJECTIVE BOX
Day _1__ of Anesthesia Pain Management Service    SUBJECTIVE:  Pain Scale Score	At rest: _2 	With Activity: ___ 	[  ] Refer to charted pain scores    THERAPY:  [ ] Epidural Bupivacaine 0.0625% and Hydromorphone 10 micrograms/mL  [ ] Epidural Ropivacaine 0.2% plain   [x ] Epidural Bupivacaine 0.01 % and Fentanyl 3 micrograms/mL  (OB)    Demand dose _3__ lockout 15___ (minutes) Continuous Rate _10__       MEDICATIONS  (STANDING):  acetaminophen   Tablet. 975 milliGRAM(s) Oral every 6 hours  fentaNYL (3 MICROgram(s)/mL) + BUpivacaine 0.01% in 0.9% Sodium Chloride PCEA 250 milliLiter(s) Epidural PCA Continuous  heparin  Injectable 5000 Unit(s) SubCutaneous every 12 hours  ketorolac   Injectable 30 milliGRAM(s) IV Push every 6 hours  lactated ringers Bolus 1000 milliLiter(s) IV Bolus once  lactated ringers. 1000 milliLiter(s) (125 mL/Hr) IV Continuous <Continuous>  lactated ringers. 1000 milliLiter(s) (125 mL/Hr) IV Continuous <Continuous>  oxytocin Infusion 333.333 milliUNIT(s)/Min (1000 mL/Hr) IV Continuous <Continuous>  oxytocin Infusion 333.333 milliUNIT(s)/Min (1000 mL/Hr) IV Continuous <Continuous>  oxytocin Infusion 41.667 milliUNIT(s)/Min (125 mL/Hr) IV Continuous <Continuous>  prenatal multivitamin 1 Tablet(s) Oral daily    MEDICATIONS  (PRN):  dexamethasone  Injectable 4 milliGRAM(s) IV Push every 6 hours PRN Nausea, IF ondansetron is ineffective after 30 - 60 minutes  diphenhydrAMINE   Capsule 25 milliGRAM(s) Oral every 6 hours PRN Itching  docusate sodium 100 milliGRAM(s) Oral two times a day PRN Stool Softening  fentaNYL (3 MICROgram(s)/mL) + BUpivacaine 0.01% in 0.9% Sodium Chloride PCEA Rescue Clinician Bolus 5 milliLiter(s) Epidural every 15 minutes PRN Moderate Pain (4 - 6)  metoclopramide Injectable 10 milliGRAM(s) IV Push once PRN Nausea and/or Vomiting  naloxone Injectable 0.1 milliGRAM(s) IV Push every 3 minutes PRN For ANY of the following changes in patient status:  A. RR LESS THAN 10 breaths per minute, B. Oxygen saturation LESS THAN 90%, C. Sedation score of 6  ondansetron Injectable 4 milliGRAM(s) IV Push every 6 hours PRN Nausea  simethicone 80 milliGRAM(s) Chew every 4 hours PRN Gas      OBJECTIVE:    Assessment of Epidural Catheter Site: 	    [xx ] Dressing intact	[x ] Site non-tender	[x ] Site without erythema, discharge, edema  [x ] Epidural tubing and connection checked	[x [ Gross neurological exam within normal limits  [ ] Catheter removed – tip intact		      Vital Signs Last 24 Hrs  T(C): 36.6 (07-28-18 @ 01:10), Max: 36.9 (07-27-18 @ 22:54)  T(F): 97.9 (07-28-18 @ 01:10), Max: 98.5 (07-27-18 @ 22:54)  HR: 95 (07-28-18 @ 01:10) (66 - 96)  BP: 99/64 (07-28-18 @ 01:10) (99/64 - 130/93)  BP(mean): 86 (07-27-18 @ 16:30) (86 - 98)  RR: 18 (07-28-18 @ 01:10) (13 - 25)  SpO2: 98% (07-28-18 @ 01:10) (98% - 99%)      Sedation Score:	[x ] Alert	[ ] Drowsy	[ ] Arousable  [ ] Asleep  [ ] Unresponsive    Side Effects:	[x ] None	[ ] Nausea	[ ] Vomiting  [ ] Pruritus  		[ ] Weakness  [ ] Numbness  [ ] Other:    ASSESSMENT/ PLAN:    Therapy to  be:	[x ] Continue   [ ] Discontinued   [ ] Change to prn Analgesics    Documentation and Verification of current medications:  [ X ] Done	[ ] Not done, not eligible, reason:    Comments:
Day __2_ of Anesthesia Pain Management Service    SUBJECTIVE:  Pain Scale Score	At rest: __2_ 	With Activity: ___ 	[  ] Refer to charted pain scores    THERAPY:  [ ] Epidural Bupivacaine 0.0625% and Hydromorphone 10 micrograms/mL  [ ] Epidural Ropivacaine 0.2% plain   [ ] Epidural Bupivacaine 0.01 % and Fentanyl 3 micrograms/mL  (OB)    Demand dose 3___ lockout _15__ (minutes) Continuous Rate 10___       MEDICATIONS  (STANDING):  acetaminophen   Tablet. 975 milliGRAM(s) Oral every 6 hours  fentaNYL (3 MICROgram(s)/mL) + BUpivacaine 0.01% in 0.9% Sodium Chloride PCEA 250 milliLiter(s) Epidural PCA Continuous  heparin  Injectable 5000 Unit(s) SubCutaneous every 12 hours  ketorolac   Injectable 30 milliGRAM(s) IV Push every 6 hours  lactated ringers Bolus 1000 milliLiter(s) IV Bolus once  lactated ringers. 1000 milliLiter(s) (125 mL/Hr) IV Continuous <Continuous>  lactated ringers. 1000 milliLiter(s) (125 mL/Hr) IV Continuous <Continuous>  oxytocin Infusion 333.333 milliUNIT(s)/Min (1000 mL/Hr) IV Continuous <Continuous>  oxytocin Infusion 333.333 milliUNIT(s)/Min (1000 mL/Hr) IV Continuous <Continuous>  oxytocin Infusion 41.667 milliUNIT(s)/Min (125 mL/Hr) IV Continuous <Continuous>  prenatal multivitamin 1 Tablet(s) Oral daily    MEDICATIONS  (PRN):  dexamethasone  Injectable 4 milliGRAM(s) IV Push every 6 hours PRN Nausea, IF ondansetron is ineffective after 30 - 60 minutes  diphenhydrAMINE   Capsule 25 milliGRAM(s) Oral every 6 hours PRN Itching  docusate sodium 100 milliGRAM(s) Oral two times a day PRN Stool Softening  fentaNYL (3 MICROgram(s)/mL) + BUpivacaine 0.01% in 0.9% Sodium Chloride PCEA Rescue Clinician Bolus 5 milliLiter(s) Epidural every 15 minutes PRN Moderate Pain (4 - 6)  metoclopramide Injectable 10 milliGRAM(s) IV Push once PRN Nausea and/or Vomiting  naloxone Injectable 0.1 milliGRAM(s) IV Push every 3 minutes PRN For ANY of the following changes in patient status:  A. RR LESS THAN 10 breaths per minute, B. Oxygen saturation LESS THAN 90%, C. Sedation score of 6  ondansetron Injectable 4 milliGRAM(s) IV Push every 6 hours PRN Nausea  simethicone 80 milliGRAM(s) Chew every 4 hours PRN Gas      OBJECTIVE:    Assessment of Epidural Catheter Site: 	    [x ] Dressing intact	[x ] Site non-tender	[x ] Site without erythema, discharge, edema  [ x] Epidural tubing and connection checked	[x [ Gross neurological exam within normal limits  [ x] Catheter removed – tip intact		                          8.6    13.4  )-----------( 169      ( 28 Jul 2018 06:58 )             25.0     Vital Signs Last 24 Hrs  T(C): 36.6 (07-29-18 @ 05:30), Max: 37.1 (07-28-18 @ 22:00)  T(F): 97.8 (07-29-18 @ 05:30), Max: 98.8 (07-28-18 @ 22:00)  HR: 101 (07-29-18 @ 05:30) (76 - 101)  BP: 109/78 (07-29-18 @ 05:30) (106/74 - 134/82)  BP(mean): --  RR: 18 (07-29-18 @ 05:30) (18 - 18)  SpO2: 100% (07-29-18 @ 05:30) (97% - 100%)      Sedation Score:	[x ] Alert	[ ] Drowsy	[ ] Arousable  [ ] Asleep  [ ] Unresponsive    Side Effects:	[x ] None	[ ] Nausea	[ ] Vomiting  [ ] Pruritus  		[ ] Weakness  [ ] Numbness  [ ] Other:    ASSESSMENT/ PLAN:    Therapy to  be:	[ ] Continue   [ x] Discontinued   [x ] Change to prn Analgesics    Documentation and Verification of current medications:  [ X ] Done	[ ] Not done, not eligible, reason:    Comments:
OB Postpartum Note:  Delivery, POD#3    S: 31yo POD#3 s/p LTCS. The patient feels well.  Pain is well controlled. She is tolerating a regular diet and passing flatus. She is voiding spontaneously, and ambulating without difficulty. Denies CP/SOB. Denies lightheadedness/dizziness. Denies N/V.    O:  Vitals:  Vital Signs Last 24 Hrs  T(C): 36.8 (2018 21:00), Max: 36.8 (2018 21:00)  T(F): 98.3 (2018 21:00), Max: 98.3 (2018 21:00)  HR: 91 (2018 21:00) (90 - 101)  BP: 117/82 (2018 21:00) (109/78 - 122/82)  BP(mean): --  RR: 18 (2018 21:00) (18 - 18)  SpO2: 98% (2018 09:00) (98% - 100%)    MEDICATIONS  (STANDING):  acetaminophen   Tablet. 975 milliGRAM(s) Oral every 6 hours  diphtheria/tetanus/pertussis (acellular) Vaccine (ADAcel) 0.5 milliLiter(s) IntraMuscular once  ferrous    sulfate 325 milliGRAM(s) Oral daily  heparin  Injectable 5000 Unit(s) SubCutaneous every 12 hours  ibuprofen  Tablet 600 milliGRAM(s) Oral every 6 hours  lactated ringers Bolus 1000 milliLiter(s) IV Bolus once  lactated ringers. 1000 milliLiter(s) (125 mL/Hr) IV Continuous <Continuous>  lactated ringers. 1000 milliLiter(s) (125 mL/Hr) IV Continuous <Continuous>  lactated ringers. 1000 milliLiter(s) (125 mL/Hr) IV Continuous <Continuous>  lactated ringers. 1000 milliLiter(s) (125 mL/Hr) IV Continuous <Continuous>  oxyCODONE    IR 5 milliGRAM(s) Oral every 3 hours  oxytocin Infusion 333.333 milliUNIT(s)/Min (1000 mL/Hr) IV Continuous <Continuous>  oxytocin Infusion 333.333 milliUNIT(s)/Min (1000 mL/Hr) IV Continuous <Continuous>  oxytocin Infusion 41.667 milliUNIT(s)/Min (125 mL/Hr) IV Continuous <Continuous>  oxytocin Infusion 41.667 milliUNIT(s)/Min (125 mL/Hr) IV Continuous <Continuous>  prenatal multivitamin 1 Tablet(s) Oral daily    MEDICATIONS  (PRN):  diphenhydrAMINE   Capsule 25 milliGRAM(s) Oral every 6 hours PRN Itching  docusate sodium 100 milliGRAM(s) Oral two times a day PRN Stool Softening  glycerin Suppository - Adult 1 Suppository(s) Rectal at bedtime PRN Constipation  lanolin Ointment 1 Application(s) Topical every 3 hours PRN Sore Nipples  metoclopramide Injectable 10 milliGRAM(s) IV Push once PRN Nausea and/or Vomiting  oxyCODONE    IR 5 milliGRAM(s) Oral every 4 hours PRN Severe Pain (7 - 10)  simethicone 80 milliGRAM(s) Chew every 4 hours PRN Gas      LABS:  Blood type: AB Positive  Rubella IgG: RPR: Negative                          9.0<L>   13.5<H> >-----------< 194    (  @ 11:29 )             27.1<L>                        8.6<L>   13.4<H> >-----------< 169    (  @ 06:58 )             25.0<L>                  Physical exam:  Gen: NAD  Abdomen: Soft, nontender, no distension , firm uterine fundus at umbilicus.  Incision: Clean, dry, and intact   Ext: No calf tenderness
OB Progress Note:  Delivery, POD#1    S: 31yo POD#1 s/p LTCS . Her pain is well controlled. She is tolerating a regular diet and passing flatus. Denies N/V. Denies CP/SOB/lightheadedness/dizziness.   She is ambulating without difficulty.   Voiding spontanously.     O:   Vital Signs Last 24 Hrs  T(C): 36.5 (2018 05:00), Max: 36.9 (2018 22:54)  T(F): 97.7 (2018 05:00), Max: 98.5 (2018 22:54)  HR: 88 (2018 05:00) (66 - 96)  BP: 90/60 (2018 05:00) (90/60 - 130/93)  BP(mean): 86 (2018 16:30) (86 - 98)  RR: 18 (2018 05:00) (13 - 25)  SpO2: 98% (2018 01:10) (98% - 99%)    Labs:  Blood type: AB Positive  Rubella IgG: RPR: Negative                          8.6<L>   13.4<H> >-----------< 169    ( 28 @ 06:58 )             25.0<L>      PE:  General: NAD  Abdomen: Mildly distended, appropriately tender, incision c/d/i.  Extremities: No erythema, no pitting edema
OB Progress Note: LTCS, POD#2    S: 31yo POD#2 s/p LTCS. Pain is well controlled. She is tolerating a regular diet and passing flatus. She is voiding spontaneously, and ambulating without difficulty. Denies CP/SOB. Denies lightheadedness/dizziness. Denies N/V.    O:  Vitals:  Vital Signs Last 24 Hrs  T(C): 36.6 (29 Jul 2018 05:30), Max: 37.1 (28 Jul 2018 22:00)  T(F): 97.8 (29 Jul 2018 05:30), Max: 98.8 (28 Jul 2018 22:00)  HR: 101 (29 Jul 2018 05:30) (76 - 101)  BP: 109/78 (29 Jul 2018 05:30) (106/74 - 134/82)  BP(mean): --  RR: 18 (29 Jul 2018 05:30) (18 - 18)  SpO2: 100% (29 Jul 2018 05:30) (97% - 100%)    MEDICATIONS  (STANDING):  acetaminophen   Tablet. 975 milliGRAM(s) Oral every 6 hours  diphtheria/tetanus/pertussis (acellular) Vaccine (ADAcel) 0.5 milliLiter(s) IntraMuscular once  ferrous    sulfate 325 milliGRAM(s) Oral daily  heparin  Injectable 5000 Unit(s) SubCutaneous every 12 hours  ibuprofen  Tablet 600 milliGRAM(s) Oral every 6 hours  ketorolac   Injectable 30 milliGRAM(s) IV Push every 6 hours  lactated ringers Bolus 1000 milliLiter(s) IV Bolus once  lactated ringers. 1000 milliLiter(s) (125 mL/Hr) IV Continuous <Continuous>  lactated ringers. 1000 milliLiter(s) (125 mL/Hr) IV Continuous <Continuous>  lactated ringers. 1000 milliLiter(s) (125 mL/Hr) IV Continuous <Continuous>  lactated ringers. 1000 milliLiter(s) (125 mL/Hr) IV Continuous <Continuous>  oxyCODONE    IR 5 milliGRAM(s) Oral every 3 hours  oxytocin Infusion 333.333 milliUNIT(s)/Min (1000 mL/Hr) IV Continuous <Continuous>  oxytocin Infusion 333.333 milliUNIT(s)/Min (1000 mL/Hr) IV Continuous <Continuous>  oxytocin Infusion 41.667 milliUNIT(s)/Min (125 mL/Hr) IV Continuous <Continuous>  oxytocin Infusion 41.667 milliUNIT(s)/Min (125 mL/Hr) IV Continuous <Continuous>  prenatal multivitamin 1 Tablet(s) Oral daily      MEDICATIONS  (PRN):  diphenhydrAMINE   Capsule 25 milliGRAM(s) Oral every 6 hours PRN Itching  docusate sodium 100 milliGRAM(s) Oral two times a day PRN Stool Softening  glycerin Suppository - Adult 1 Suppository(s) Rectal at bedtime PRN Constipation  lanolin Ointment 1 Application(s) Topical every 3 hours PRN Sore Nipples  metoclopramide Injectable 10 milliGRAM(s) IV Push once PRN Nausea and/or Vomiting  oxyCODONE    IR 5 milliGRAM(s) Oral every 4 hours PRN Severe Pain (7 - 10)  simethicone 80 milliGRAM(s) Chew every 4 hours PRN Gas      Labs:  Blood type: AB Positive  Rubella IgG: RPR: Negative                          8.6<L>   13.4<H> >-----------< 169    ( 07-28 @ 06:58 )             25.0<L>        PE:  General: NAD  Abdomen: Soft, appropriately tender, incision c/d/i.  Extremities: No erythema, no pitting edema

## 2018-08-09 ENCOUNTER — LABORATORY RESULT (OUTPATIENT)
Age: 33
End: 2018-08-09

## 2018-08-09 ENCOUNTER — OUTPATIENT (OUTPATIENT)
Dept: OUTPATIENT SERVICES | Facility: HOSPITAL | Age: 33
LOS: 1 days | End: 2018-08-09
Payer: MEDICAID

## 2018-08-09 ENCOUNTER — APPOINTMENT (OUTPATIENT)
Dept: OBGYN | Facility: CLINIC | Age: 33
End: 2018-08-09
Payer: MEDICAID

## 2018-08-09 VITALS
BODY MASS INDEX: 27.47 KG/M2 | WEIGHT: 181.25 LBS | DIASTOLIC BLOOD PRESSURE: 80 MMHG | SYSTOLIC BLOOD PRESSURE: 122 MMHG | HEIGHT: 68 IN

## 2018-08-09 DIAGNOSIS — Z34.82 ENCOUNTER FOR SUPERVISION OF OTHER NORMAL PREGNANCY, SECOND TRIMESTER: ICD-10-CM

## 2018-08-09 DIAGNOSIS — Z3A.16 16 WEEKS GESTATION OF PREGNANCY: ICD-10-CM

## 2018-08-09 DIAGNOSIS — Z34.93 ENCOUNTER FOR SUPERVISION OF NORMAL PREGNANCY, UNSPECIFIED, THIRD TRIMESTER: ICD-10-CM

## 2018-08-09 DIAGNOSIS — Z87.39 PERSONAL HISTORY OF OTHER DISEASES OF THE MUSCULOSKELETAL SYSTEM AND CONNECTIVE TISSUE: ICD-10-CM

## 2018-08-09 DIAGNOSIS — O99.013 ANEMIA COMPLICATING PREGNANCY, THIRD TRIMESTER: ICD-10-CM

## 2018-08-09 DIAGNOSIS — Z30.9 ENCOUNTER FOR CONTRACEPTIVE MANAGEMENT, UNSPECIFIED: ICD-10-CM

## 2018-08-09 DIAGNOSIS — R79.89 OTHER SPECIFIED ABNORMAL FINDINGS OF BLOOD CHEMISTRY: ICD-10-CM

## 2018-08-09 LAB
HCT VFR BLD CALC: 35 % — SIGNIFICANT CHANGE UP (ref 34.5–45)
HGB BLD-MCNC: 11.3 G/DL — LOW (ref 11.5–15.5)
MCHC RBC-ENTMCNC: 29.5 PG — SIGNIFICANT CHANGE UP (ref 27–34)
MCHC RBC-ENTMCNC: 32.3 GM/DL — SIGNIFICANT CHANGE UP (ref 32–36)
MCV RBC AUTO: 91.4 FL — SIGNIFICANT CHANGE UP (ref 80–100)
PLATELET # BLD AUTO: 610 K/UL — HIGH (ref 150–400)
RBC # BLD: 3.83 M/UL — SIGNIFICANT CHANGE UP (ref 3.8–5.2)
RBC # FLD: 14.3 % — SIGNIFICANT CHANGE UP (ref 10.3–14.5)
WBC # BLD: 11.17 K/UL — HIGH (ref 3.8–10.5)
WBC # FLD AUTO: 11.17 K/UL — HIGH (ref 3.8–10.5)

## 2018-08-09 PROCEDURE — G0463: CPT

## 2018-08-09 PROCEDURE — 99214 OFFICE O/P EST MOD 30 MIN: CPT | Mod: NC

## 2018-08-09 PROCEDURE — 85027 COMPLETE CBC AUTOMATED: CPT

## 2018-09-07 ENCOUNTER — OUTPATIENT (OUTPATIENT)
Dept: OUTPATIENT SERVICES | Facility: HOSPITAL | Age: 33
LOS: 1 days | End: 2018-09-07
Payer: MEDICAID

## 2018-09-07 ENCOUNTER — APPOINTMENT (OUTPATIENT)
Dept: OBGYN | Facility: CLINIC | Age: 33
End: 2018-09-07
Payer: MEDICAID

## 2018-09-07 VITALS
SYSTOLIC BLOOD PRESSURE: 110 MMHG | WEIGHT: 176.25 LBS | DIASTOLIC BLOOD PRESSURE: 80 MMHG | BODY MASS INDEX: 28.33 KG/M2 | HEIGHT: 66 IN

## 2018-09-07 PROCEDURE — 99213 OFFICE O/P EST LOW 20 MIN: CPT

## 2018-09-07 PROCEDURE — G0463: CPT

## 2018-09-19 ENCOUNTER — OUTPATIENT (OUTPATIENT)
Dept: OUTPATIENT SERVICES | Facility: HOSPITAL | Age: 33
LOS: 1 days | End: 2018-09-19
Payer: MEDICAID

## 2018-09-19 ENCOUNTER — APPOINTMENT (OUTPATIENT)
Dept: OBGYN | Facility: CLINIC | Age: 33
End: 2018-09-19
Payer: MEDICAID

## 2018-09-19 VITALS — WEIGHT: 173 LBS | BODY MASS INDEX: 27.92 KG/M2 | SYSTOLIC BLOOD PRESSURE: 128 MMHG | DIASTOLIC BLOOD PRESSURE: 80 MMHG

## 2018-09-19 DIAGNOSIS — N76.0 ACUTE VAGINITIS: ICD-10-CM

## 2018-09-19 DIAGNOSIS — Z30.430 ENCOUNTER FOR INSERTION OF INTRAUTERINE CONTRACEPTIVE DEVICE: ICD-10-CM

## 2018-09-19 LAB — HCG UR QL: NEGATIVE

## 2018-09-19 PROCEDURE — 81025 URINE PREGNANCY TEST: CPT | Mod: NC

## 2018-09-19 PROCEDURE — 76830 TRANSVAGINAL US NON-OB: CPT

## 2018-09-19 PROCEDURE — 58300 INSERT INTRAUTERINE DEVICE: CPT

## 2018-09-19 PROCEDURE — 76830 TRANSVAGINAL US NON-OB: CPT | Mod: 26

## 2018-09-19 PROCEDURE — 81025 URINE PREGNANCY TEST: CPT

## 2018-11-27 PROBLEM — Z97.5 IUD (INTRAUTERINE DEVICE) IN PLACE: Status: ACTIVE | Noted: 2018-11-27

## 2018-11-28 ENCOUNTER — APPOINTMENT (OUTPATIENT)
Dept: OBGYN | Facility: CLINIC | Age: 33
End: 2018-11-28
Payer: MEDICAID

## 2018-11-28 ENCOUNTER — OUTPATIENT (OUTPATIENT)
Dept: OUTPATIENT SERVICES | Facility: HOSPITAL | Age: 33
LOS: 1 days | End: 2018-11-28
Payer: MEDICAID

## 2018-11-28 VITALS — DIASTOLIC BLOOD PRESSURE: 80 MMHG | BODY MASS INDEX: 28.25 KG/M2 | SYSTOLIC BLOOD PRESSURE: 120 MMHG | WEIGHT: 175 LBS

## 2018-11-28 DIAGNOSIS — N76.0 ACUTE VAGINITIS: ICD-10-CM

## 2018-11-28 DIAGNOSIS — Z97.5 PRESENCE OF (INTRAUTERINE) CONTRACEPTIVE DEVICE: ICD-10-CM

## 2018-11-28 PROCEDURE — 99213 OFFICE O/P EST LOW 20 MIN: CPT | Mod: NC,GC

## 2018-11-28 PROCEDURE — 76856 US EXAM PELVIC COMPLETE: CPT | Mod: 26,GC

## 2018-11-28 PROCEDURE — 76856 US EXAM PELVIC COMPLETE: CPT

## 2018-11-28 PROCEDURE — G0463: CPT

## 2018-11-28 RX ORDER — SPIRONOLACTONE 25 MG/1
25 TABLET ORAL DAILY
Qty: 30 | Refills: 2 | Status: ACTIVE | COMMUNITY
Start: 2018-11-28 | End: 1900-01-01

## 2018-11-29 DIAGNOSIS — Z97.5 PRESENCE OF (INTRAUTERINE) CONTRACEPTIVE DEVICE: ICD-10-CM

## 2019-01-10 ENCOUNTER — APPOINTMENT (OUTPATIENT)
Dept: OBGYN | Facility: CLINIC | Age: 34
End: 2019-01-10
Payer: MEDICAID

## 2019-01-10 ENCOUNTER — OUTPATIENT (OUTPATIENT)
Dept: OUTPATIENT SERVICES | Facility: HOSPITAL | Age: 34
LOS: 1 days | End: 2019-01-10
Payer: MEDICAID

## 2019-01-10 VITALS — DIASTOLIC BLOOD PRESSURE: 80 MMHG | SYSTOLIC BLOOD PRESSURE: 122 MMHG | BODY MASS INDEX: 29.38 KG/M2 | WEIGHT: 182 LBS

## 2019-01-10 DIAGNOSIS — N76.0 ACUTE VAGINITIS: ICD-10-CM

## 2019-01-10 DIAGNOSIS — Z30.433 ENCOUNTER FOR REMOVAL AND REINSERTION OF INTRAUTERINE CONTRACEPTIVE DEVICE: ICD-10-CM

## 2019-01-10 DIAGNOSIS — Z30.430 ENCOUNTER FOR INSERTION OF INTRAUTERINE CONTRACEPTIVE DEVICE: ICD-10-CM

## 2019-01-10 PROCEDURE — 58301 REMOVE INTRAUTERINE DEVICE: CPT

## 2019-01-10 PROCEDURE — 58300 INSERT INTRAUTERINE DEVICE: CPT

## 2019-01-10 PROCEDURE — 81025 URINE PREGNANCY TEST: CPT | Mod: NC

## 2019-01-10 PROCEDURE — G0463: CPT | Mod: 25

## 2019-01-10 PROCEDURE — 58300 INSERT INTRAUTERINE DEVICE: CPT | Mod: NC

## 2019-01-10 PROCEDURE — 81025 URINE PREGNANCY TEST: CPT

## 2019-01-10 PROCEDURE — 99214 OFFICE O/P EST MOD 30 MIN: CPT | Mod: NC,25

## 2019-01-10 PROCEDURE — 58301 REMOVE INTRAUTERINE DEVICE: CPT | Mod: NC

## 2019-01-11 NOTE — PROCEDURE
[Locate IUD] : locate IUD [Present] : uterus present [Anteverted] : anteverted [IUD Placement] : intrauterine device (IUD) placement [Prevention of Pregnancy] : prevention of pregnancy [Benefits] : benefits [Alternatives] : alternatives [Infection] : infection [Uterine Perforation] : uterine perforation [CONSENT OBTAINED] : written consent was obtained prior to the procedure. [LMP ___] : LMP was [unfilled] [Neg Pregnancy Test] : a pregnancy test was negative [Neg GC/Chlamydia] : a a serum GC/Chlamydia was negative [No Premedication] : No premedication [Betadine] : Prepped with Betadine [None] : none [Easy Passage] : allowed easy passage of a uterine sound without dilation [ParaGard IUD] : The ParaGard IUD was inserted to the fundus of the uterus.  The IUD strings were cut to an appropriate length. [Lot Number: ___] : IUD lot number: [unfilled] [Tolerated Well] : the patient tolerated the procedure well [No Complications] : there were no complications [IUD Removal] : IUD [Patient] : patient [Risks] : risks [Consent Obtained] : consent was obtained prior to the procedure and is detailed in the patient's record [Speculum Placed] : a speculum was placed in the vagina [Strings Visualized] : the IUD strings were visualized [IUD Discarded] : discarded [FreeTextEntry3] : TVUS done IUD placement confrimed   [FreeTextEntry4] : paragard proper placement confirmed  [de-identified] : Marycarmen clamp used  [de-identified] : does not like the amenorrhea

## 2019-01-15 ENCOUNTER — EMERGENCY (EMERGENCY)
Facility: HOSPITAL | Age: 34
LOS: 1 days | Discharge: HOME | End: 2019-01-15
Attending: EMERGENCY MEDICINE
Payer: MEDICAID

## 2019-01-15 ENCOUNTER — CLINICAL ADVICE (OUTPATIENT)
Age: 34
End: 2019-01-15

## 2019-01-15 VITALS
HEART RATE: 76 BPM | TEMPERATURE: 98 F | OXYGEN SATURATION: 99 % | DIASTOLIC BLOOD PRESSURE: 81 MMHG | RESPIRATION RATE: 16 BRPM | SYSTOLIC BLOOD PRESSURE: 121 MMHG

## 2019-01-15 VITALS
RESPIRATION RATE: 16 BRPM | OXYGEN SATURATION: 99 % | HEART RATE: 82 BPM | TEMPERATURE: 98 F | DIASTOLIC BLOOD PRESSURE: 92 MMHG | WEIGHT: 182.1 LBS | HEIGHT: 66 IN | SYSTOLIC BLOOD PRESSURE: 126 MMHG

## 2019-01-15 LAB
ALBUMIN SERPL ELPH-MCNC: 4.8 G/DL — SIGNIFICANT CHANGE UP (ref 3.3–5)
ALP SERPL-CCNC: 67 U/L — SIGNIFICANT CHANGE UP (ref 40–120)
ALT FLD-CCNC: 35 U/L — SIGNIFICANT CHANGE UP (ref 10–45)
ANION GAP SERPL CALC-SCNC: 14 MMOL/L — SIGNIFICANT CHANGE UP (ref 5–17)
APPEARANCE UR: ABNORMAL
APTT BLD: 33.8 SEC — SIGNIFICANT CHANGE UP (ref 27.5–36.3)
AST SERPL-CCNC: 49 U/L — HIGH (ref 10–40)
BACTERIA # UR AUTO: NEGATIVE — SIGNIFICANT CHANGE UP
BASOPHILS # BLD AUTO: 0.1 K/UL — SIGNIFICANT CHANGE UP (ref 0–0.2)
BASOPHILS NFR BLD AUTO: 0.7 % — SIGNIFICANT CHANGE UP (ref 0–2)
BILIRUB SERPL-MCNC: 0.3 MG/DL — SIGNIFICANT CHANGE UP (ref 0.2–1.2)
BILIRUB UR-MCNC: NEGATIVE — SIGNIFICANT CHANGE UP
BUN SERPL-MCNC: 14 MG/DL — SIGNIFICANT CHANGE UP (ref 7–23)
CALCIUM SERPL-MCNC: 9.1 MG/DL — SIGNIFICANT CHANGE UP (ref 8.4–10.5)
CHLORIDE SERPL-SCNC: 100 MMOL/L — SIGNIFICANT CHANGE UP (ref 96–108)
CO2 SERPL-SCNC: 23 MMOL/L — SIGNIFICANT CHANGE UP (ref 22–31)
COLOR SPEC: SIGNIFICANT CHANGE UP
CREAT SERPL-MCNC: 1.05 MG/DL — SIGNIFICANT CHANGE UP (ref 0.5–1.3)
DIFF PNL FLD: ABNORMAL
EOSINOPHIL # BLD AUTO: 0.2 K/UL — SIGNIFICANT CHANGE UP (ref 0–0.5)
EOSINOPHIL NFR BLD AUTO: 2.2 % — SIGNIFICANT CHANGE UP (ref 0–6)
EPI CELLS # UR: 5 /HPF — SIGNIFICANT CHANGE UP
GAS PNL BLDV: SIGNIFICANT CHANGE UP
GLUCOSE SERPL-MCNC: 91 MG/DL — SIGNIFICANT CHANGE UP (ref 70–99)
GLUCOSE UR QL: NEGATIVE — SIGNIFICANT CHANGE UP
HCG UR QL: NEGATIVE — SIGNIFICANT CHANGE UP
HCT VFR BLD CALC: 36 % — SIGNIFICANT CHANGE UP (ref 34.5–45)
HGB BLD-MCNC: 12.3 G/DL — SIGNIFICANT CHANGE UP (ref 11.5–15.5)
HYALINE CASTS # UR AUTO: 4 /LPF — HIGH (ref 0–2)
INR BLD: 1.09 RATIO — SIGNIFICANT CHANGE UP (ref 0.88–1.16)
KETONES UR-MCNC: SIGNIFICANT CHANGE UP
LEUKOCYTE ESTERASE UR-ACNC: ABNORMAL
LYMPHOCYTES # BLD AUTO: 2.7 K/UL — SIGNIFICANT CHANGE UP (ref 1–3.3)
LYMPHOCYTES # BLD AUTO: 28 % — SIGNIFICANT CHANGE UP (ref 13–44)
MCHC RBC-ENTMCNC: 29.7 PG — SIGNIFICANT CHANGE UP (ref 27–34)
MCHC RBC-ENTMCNC: 34.2 GM/DL — SIGNIFICANT CHANGE UP (ref 32–36)
MCV RBC AUTO: 86.7 FL — SIGNIFICANT CHANGE UP (ref 80–100)
MONOCYTES # BLD AUTO: 0.4 K/UL — SIGNIFICANT CHANGE UP (ref 0–0.9)
MONOCYTES NFR BLD AUTO: 4.4 % — SIGNIFICANT CHANGE UP (ref 2–14)
NEUTROPHILS # BLD AUTO: 6.3 K/UL — SIGNIFICANT CHANGE UP (ref 1.8–7.4)
NEUTROPHILS NFR BLD AUTO: 64.7 % — SIGNIFICANT CHANGE UP (ref 43–77)
NITRITE UR-MCNC: NEGATIVE — SIGNIFICANT CHANGE UP
PH UR: 6.5 — SIGNIFICANT CHANGE UP (ref 5–8)
PLATELET # BLD AUTO: 294 K/UL — SIGNIFICANT CHANGE UP (ref 150–400)
POTASSIUM SERPL-MCNC: 3.8 MMOL/L — SIGNIFICANT CHANGE UP (ref 3.5–5.3)
POTASSIUM SERPL-SCNC: 3.8 MMOL/L — SIGNIFICANT CHANGE UP (ref 3.5–5.3)
PROT SERPL-MCNC: 7.6 G/DL — SIGNIFICANT CHANGE UP (ref 6–8.3)
PROT UR-MCNC: ABNORMAL
PROTHROM AB SERPL-ACNC: 12.5 SEC — SIGNIFICANT CHANGE UP (ref 10–12.9)
RBC # BLD: 4.15 M/UL — SIGNIFICANT CHANGE UP (ref 3.8–5.2)
RBC # FLD: 14.6 % — HIGH (ref 10.3–14.5)
RBC CASTS # UR COMP ASSIST: 1238 /HPF — HIGH (ref 0–4)
SODIUM SERPL-SCNC: 137 MMOL/L — SIGNIFICANT CHANGE UP (ref 135–145)
SP GR SPEC: 1.01 — LOW (ref 1.01–1.02)
UROBILINOGEN FLD QL: NEGATIVE — SIGNIFICANT CHANGE UP
WBC # BLD: 9.7 K/UL — SIGNIFICANT CHANGE UP (ref 3.8–10.5)
WBC # FLD AUTO: 9.7 K/UL — SIGNIFICANT CHANGE UP (ref 3.8–10.5)
WBC UR QL: 13 /HPF — HIGH (ref 0–5)

## 2019-01-15 PROCEDURE — 80053 COMPREHEN METABOLIC PANEL: CPT

## 2019-01-15 PROCEDURE — 99284 EMERGENCY DEPT VISIT MOD MDM: CPT

## 2019-01-15 PROCEDURE — 81001 URINALYSIS AUTO W/SCOPE: CPT

## 2019-01-15 PROCEDURE — 85730 THROMBOPLASTIN TIME PARTIAL: CPT

## 2019-01-15 PROCEDURE — 81025 URINE PREGNANCY TEST: CPT

## 2019-01-15 PROCEDURE — 84295 ASSAY OF SERUM SODIUM: CPT

## 2019-01-15 PROCEDURE — 83605 ASSAY OF LACTIC ACID: CPT

## 2019-01-15 PROCEDURE — 82330 ASSAY OF CALCIUM: CPT

## 2019-01-15 PROCEDURE — 82803 BLOOD GASES ANY COMBINATION: CPT

## 2019-01-15 PROCEDURE — 93975 VASCULAR STUDY: CPT

## 2019-01-15 PROCEDURE — 84132 ASSAY OF SERUM POTASSIUM: CPT

## 2019-01-15 PROCEDURE — 82947 ASSAY GLUCOSE BLOOD QUANT: CPT

## 2019-01-15 PROCEDURE — 76830 TRANSVAGINAL US NON-OB: CPT | Mod: 26

## 2019-01-15 PROCEDURE — 93975 VASCULAR STUDY: CPT | Mod: 26

## 2019-01-15 PROCEDURE — 85014 HEMATOCRIT: CPT

## 2019-01-15 PROCEDURE — 82435 ASSAY OF BLOOD CHLORIDE: CPT

## 2019-01-15 PROCEDURE — 85610 PROTHROMBIN TIME: CPT

## 2019-01-15 PROCEDURE — 85027 COMPLETE CBC AUTOMATED: CPT

## 2019-01-15 PROCEDURE — 76830 TRANSVAGINAL US NON-OB: CPT

## 2019-01-15 RX ORDER — SODIUM CHLORIDE 9 MG/ML
1000 INJECTION INTRAMUSCULAR; INTRAVENOUS; SUBCUTANEOUS ONCE
Qty: 0 | Refills: 0 | Status: COMPLETED | OUTPATIENT
Start: 2019-01-15 | End: 2019-01-15

## 2019-01-15 RX ORDER — IBUPROFEN 200 MG
600 TABLET ORAL ONCE
Qty: 0 | Refills: 0 | Status: COMPLETED | OUTPATIENT
Start: 2019-01-15 | End: 2019-01-15

## 2019-01-15 RX ADMIN — SODIUM CHLORIDE 2000 MILLILITER(S): 9 INJECTION INTRAMUSCULAR; INTRAVENOUS; SUBCUTANEOUS at 20:22

## 2019-01-15 NOTE — ED ADULT NURSE REASSESSMENT NOTE - NS ED NURSE REASSESS COMMENT FT1
19:15. Report received from COLLEEN Hawkins. Pt AAOx4, NAD, resp nonlabored, skin warm/dry, resting comfortably in bed with family at bedside. Pt denies headache, chest pain, palpitations, SOB, abd pain, n/v/d, fevers, chills, weakness at this time. Pt awaiting to evaluated by MD. Safety maintained.

## 2019-01-15 NOTE — ED ADULT NURSE NOTE - NS ED NURSE DC INFO COMPLEXITY
Detail Level: Detailed
Procedure To Be Performed At Next Visit: Cryotherapy
Verbalized Understanding

## 2019-01-15 NOTE — ED ADULT NURSE REASSESSMENT NOTE - NS ED NURSE REASSESS COMMENT FT1
Pt AAOx4, NAD, resting comfortably in bed with spouse at bedside. Pt denies headache, dizziness, chest pain, cough, SOB, abdominal pain, n/v/d, urinary symptoms, fevers, chills, weakness at this time. Pt verbalized understanding to follow-up with OB/GYN. Pt verbalized understanding to return to ED for dizziness, SOB, confusion, weakness, blurred vision, or palpitations. Pt discharged as per MD, IV removed as per MD, pt ambulated independently out of ED.

## 2019-01-15 NOTE — ED PROVIDER NOTE - OBJECTIVE STATEMENT
33F  status post normal  delivery 19 by Dr. Viveros status post mirena IUD initially and subsequent change 1 week ago to Paragard with heavy vaginal bleeding and clots since, 7 pads today, and while showering this AM noted heavy bleeding with clots and IUD falling out.  A/w mild lower abdominal cramping.  Has felt lightheaded with mild nausea since, now feeling better.  Bleeding seems to be slowing.  Denies fever, chest pain, shortness of breath, diarrhea, dysuria, abdominal pain.

## 2019-01-15 NOTE — ED PROVIDER NOTE - PROGRESS NOTE DETAILS
Vaginal bleeding improved, pain improved, no longer bleeding. Feels well.  TVUS normal.  Will send home with obgyn fu.

## 2019-01-15 NOTE — ED ADULT NURSE NOTE - OBJECTIVE STATEMENT
34 yo F presents to ED A+XO3 accompanied by her  c/o vaginal bleeding. Pt. states on 1/10 she had "the mirena taken out and the paraguard put in." Pt. states she has been having "heavy" vaginal bleeding since the IUD was switched. Pt. states she has been "changing my pad every hour." Pt. reports mild dizziness and lightheadedness today. Pt. states while she was in the shower earlier today she "passed a lot of blood and clots and the IUD came out with one of the clots." As per  "she looks a little pale." Breathing unlabored on RA. Skin warm and dry. Pt. denies chest pain, SOB, burning with urination, urinary frequency. Pt. reports abdominal pain/cramping that improved with taking motrin at home and using hot packs. Comfort and safety measures in place.  at bedside.

## 2019-01-15 NOTE — ED PROVIDER NOTE - ATTENDING CONTRIBUTION TO CARE
I have seen and evaluated this patient with the resident.   I agree with the findings  unless other wise stated.  I have made appropriate changes in documentations where needed, After my face to face bedside evaluation, I am further  noting: Pt with multiparity and IUD in place had episodes of vaginal bleeding with clots also noticed IUD expelled Abdomen soft non tender no masses no cva tenderness mild vaginal bleeding currently labs and US noted will d/c home with PMD GYN follow up --Higgins

## 2019-01-15 NOTE — ED ADULT NURSE REASSESSMENT NOTE - NS ED NURSE REASSESS COMMENT FT1
pt ambulated on own and stated felt dizzier; revitaled pt; vss; pt placed in wheelchair adv not to ambulate on own

## 2019-01-15 NOTE — ED PROVIDER NOTE - NSFOLLOWUPINSTRUCTIONS_ED_ALL_ED_FT
1) You were here for vaginal bleeding.   2) Take motrin as needed for pain.   3) Follow up with your OBGYN this week for further evaluation and to answer any questions you have.    4) Return to the emergency department if you experience worsening symptoms, pain, fever, chills, nausea, vomiting or other concerning symptoms.

## 2019-01-15 NOTE — ED PROVIDER NOTE - PHYSICAL EXAMINATION
***GEN - well appearing; NAD   ***HEAD - NC/AT  ***EYES/NOSE - PEERL, EOMI, mucous membranes moist, no discharge   ***THROAT: Oral cavity and pharynx normal. No inflammation, swelling, exudate, or lesions.    ***NECK: supple, non-tender no lymphadenopathy  ***PULMONARY - CTA b/l, symmetric breath sounds.   ***CARDIAC- s1s2, RRR, no murmur  ***ABDOMEN - +BS, ND, minimal tenderness to palpation suprapubic, c/s scar intact and well healed, soft, no guarding, no rebound, no organomegaly  ***BACK - no CVA tenderness, Normal  spine, no spinal TTP  ***EXTREMITIES - symmetric pulses, 2+ dp, capillary refill < 2 seconds, no clubbing, no cyanosis, no edema   ***SKIN - warm, dry, intact, no rash or bruising   ***NEUROLOGIC - a&o x3, CN 3-12 intact, sensation nl, motor 5/5 RUE/LUE/RLE/LLE gait nl,

## 2019-01-16 DIAGNOSIS — Z30.430 ENCOUNTER FOR INSERTION OF INTRAUTERINE CONTRACEPTIVE DEVICE: ICD-10-CM

## 2019-01-16 DIAGNOSIS — Z30.433 ENCOUNTER FOR REMOVAL AND REINSERTION OF INTRAUTERINE CONTRACEPTIVE DEVICE: ICD-10-CM

## 2019-05-23 ENCOUNTER — TRANSCRIPTION ENCOUNTER (OUTPATIENT)
Age: 34
End: 2019-05-23

## 2024-05-06 DIAGNOSIS — M25.562 PAIN IN LEFT KNEE: ICD-10-CM

## 2024-05-08 ENCOUNTER — APPOINTMENT (OUTPATIENT)
Dept: ORTHOPEDIC SURGERY | Facility: CLINIC | Age: 39
End: 2024-05-08

## 2024-11-27 NOTE — ED ADULT NURSE NOTE - CAS EDN DISCHARGE INTERVENTIONS
T(C): 36.6 (11-27-24 @ 03:42), Max: 36.8 (11-26-24 @ 18:07)  HR: 119 (11-27-24 @ 03:42) (94 - 119)  BP: 116/73 (11-27-24 @ 03:42) (93/56 - 173/103)  RR: 14 (11-27-24 @ 03:42) (14 - 19)  SpO2: 97% (11-27-24 @ 03:42) (94% - 98%)    CONSTITUTIONAL: No apparent distress  EYES: PERRLA and symmetric, EOMI, No conjunctival or scleral injection, non-icteric  ENMT: Oral mucosa with moist membranes.  NECK: Supple, symmetric. No JVD.  RESP: No respiratory distress, no use of accessory muscles; CTA b/l, no WRR  CV: RRR, +S1S2, no MRG  GI: Soft, NT, ND, no rebound, no guarding  : No suprapubic tenderness. No CVA tenderness.  LYMPH: No cervical LAD or tenderness  MSK: No spinal tenderness, normal muscle strength/tone  EXTREMITIES: No pedal edema  SKIN: No rashes or ulcers noted  NEURO: A+Ox3, responsive. No tremor, sensation intact in upper and lower extremities b/l  PSYCH: Appropriate insight/judgment; mood and affect appropriate, recent/remote memory intact T(C): 36.6 (11-27-24 @ 03:42), Max: 36.8 (11-26-24 @ 18:07)  HR: 119 (11-27-24 @ 03:42) (94 - 119)  BP: 116/73 (11-27-24 @ 03:42) (93/56 - 173/103)  RR: 14 (11-27-24 @ 03:42) (14 - 19)  SpO2: 97% (11-27-24 @ 03:42) (94% - 98%)    CONSTITUTIONAL: No apparent distress. Very lethargic  ENMT: Oral mucosa with moist membranes.  NECK: Supple, symmetric. No JVD.  RESP: No respiratory distress, no use of accessory muscles; CTA b/l, no WRR  CV: RRR, +S1S2, systolic murmur 2/6  GI: Soft, NT, ND, no rebound, no guarding  : No suprapubic tenderness. No CVA tenderness.  LYMPH: No cervical LAD or tenderness  MSK: No spinal tenderness   EXTREMITIES: No pedal edema  SKIN: No rashes or ulcers noted  NEURO: Not alert, mildly responsive to painful stimuli  PSYCH: Unable to assess IV discontinued, cath removed intact

## 2025-08-20 ENCOUNTER — OUTPATIENT (OUTPATIENT)
Dept: OUTPATIENT SERVICES | Facility: HOSPITAL | Age: 40
LOS: 1 days | End: 2025-08-20

## 2025-08-20 VITALS
HEIGHT: 64 IN | OXYGEN SATURATION: 100 % | HEART RATE: 81 BPM | SYSTOLIC BLOOD PRESSURE: 100 MMHG | WEIGHT: 132.06 LBS | TEMPERATURE: 98 F | RESPIRATION RATE: 15 BRPM | DIASTOLIC BLOOD PRESSURE: 68 MMHG

## 2025-08-20 DIAGNOSIS — N93.9 ABNORMAL UTERINE AND VAGINAL BLEEDING, UNSPECIFIED: ICD-10-CM

## 2025-08-20 DIAGNOSIS — Z98.890 OTHER SPECIFIED POSTPROCEDURAL STATES: Chronic | ICD-10-CM

## 2025-08-20 DIAGNOSIS — R10.2 PELVIC AND PERINEAL PAIN: ICD-10-CM

## 2025-08-20 DIAGNOSIS — E03.9 HYPOTHYROIDISM, UNSPECIFIED: ICD-10-CM

## 2025-08-20 DIAGNOSIS — N80.03 ADENOMYOSIS OF THE UTERUS: ICD-10-CM

## 2025-08-20 DIAGNOSIS — Z98.891 HISTORY OF UTERINE SCAR FROM PREVIOUS SURGERY: Chronic | ICD-10-CM

## 2025-08-20 LAB
HCT VFR BLD CALC: 36.1 % — SIGNIFICANT CHANGE UP (ref 34.5–45)
HGB BLD-MCNC: 11.8 G/DL — SIGNIFICANT CHANGE UP (ref 11.5–15.5)
MCHC RBC-ENTMCNC: 28.1 PG — SIGNIFICANT CHANGE UP (ref 27–34)
MCHC RBC-ENTMCNC: 32.7 G/DL — SIGNIFICANT CHANGE UP (ref 32–36)
MCV RBC AUTO: 86 FL — SIGNIFICANT CHANGE UP (ref 80–100)
NRBC # BLD AUTO: 0 K/UL — SIGNIFICANT CHANGE UP (ref 0–0)
NRBC # FLD: 0 K/UL — SIGNIFICANT CHANGE UP (ref 0–0)
NRBC BLD AUTO-RTO: 0 /100 WBCS — SIGNIFICANT CHANGE UP (ref 0–0)
PLATELET # BLD AUTO: 297 K/UL — SIGNIFICANT CHANGE UP (ref 150–400)
PMV BLD: 10.3 FL — SIGNIFICANT CHANGE UP (ref 7–13)
RBC # BLD: 4.2 M/UL — SIGNIFICANT CHANGE UP (ref 3.8–5.2)
RBC # FLD: 13.9 % — SIGNIFICANT CHANGE UP (ref 10.3–14.5)
WBC # BLD: 7.92 K/UL — SIGNIFICANT CHANGE UP (ref 3.8–10.5)
WBC # FLD AUTO: 7.92 K/UL — SIGNIFICANT CHANGE UP (ref 3.8–10.5)

## 2025-08-28 ENCOUNTER — OUTPATIENT (OUTPATIENT)
Dept: OUTPATIENT SERVICES | Facility: HOSPITAL | Age: 40
LOS: 1 days | End: 2025-08-28
Payer: MEDICAID

## 2025-08-28 ENCOUNTER — TRANSCRIPTION ENCOUNTER (OUTPATIENT)
Age: 40
End: 2025-08-28

## 2025-08-28 VITALS
OXYGEN SATURATION: 98 % | SYSTOLIC BLOOD PRESSURE: 115 MMHG | RESPIRATION RATE: 16 BRPM | DIASTOLIC BLOOD PRESSURE: 76 MMHG | HEART RATE: 70 BPM | TEMPERATURE: 98 F

## 2025-08-28 VITALS
RESPIRATION RATE: 15 BRPM | TEMPERATURE: 98 F | WEIGHT: 132.06 LBS | SYSTOLIC BLOOD PRESSURE: 105 MMHG | HEART RATE: 65 BPM | OXYGEN SATURATION: 100 % | DIASTOLIC BLOOD PRESSURE: 71 MMHG | HEIGHT: 64 IN

## 2025-08-28 DIAGNOSIS — Z98.891 HISTORY OF UTERINE SCAR FROM PREVIOUS SURGERY: Chronic | ICD-10-CM

## 2025-08-28 DIAGNOSIS — Z98.890 OTHER SPECIFIED POSTPROCEDURAL STATES: Chronic | ICD-10-CM

## 2025-08-28 DIAGNOSIS — N80.03 ADENOMYOSIS OF THE UTERUS: ICD-10-CM

## 2025-08-28 LAB — HCG UR QL: NEGATIVE — SIGNIFICANT CHANGE UP

## 2025-08-28 PROCEDURE — 88305 TISSUE EXAM BY PATHOLOGIST: CPT | Mod: 26

## 2025-08-28 DEVICE — AVETA SMOL RESECTING DEVICE 2.9MM: Type: IMPLANTABLE DEVICE | Status: FUNCTIONAL

## 2025-08-28 DEVICE — BIRTH CONTROL IUD MIRENA: Type: IMPLANTABLE DEVICE | Status: FUNCTIONAL

## 2025-08-28 DEVICE — NOVASURE V5: Type: IMPLANTABLE DEVICE | Status: FUNCTIONAL

## 2025-08-28 RX ORDER — FENTANYL CITRATE-0.9 % NACL/PF 100MCG/2ML
50 SYRINGE (ML) INTRAVENOUS
Refills: 0 | Status: DISCONTINUED | OUTPATIENT
Start: 2025-08-28 | End: 2025-08-28

## 2025-08-28 RX ORDER — IBUPROFEN 200 MG
1 TABLET ORAL
Qty: 20 | Refills: 0
Start: 2025-08-28

## 2025-08-28 RX ORDER — OXYCODONE HYDROCHLORIDE 30 MG/1
1 TABLET ORAL
Qty: 8 | Refills: 0
Start: 2025-08-28

## 2025-08-28 RX ORDER — ONDANSETRON HCL/PF 4 MG/2 ML
4 VIAL (ML) INJECTION ONCE
Refills: 0 | Status: ACTIVE | OUTPATIENT
Start: 2025-08-28 | End: 2026-07-27

## 2025-08-28 RX ORDER — LEVOTHYROXINE SODIUM 300 MCG
1 TABLET ORAL
Refills: 0 | DISCHARGE

## 2025-08-28 RX ORDER — SODIUM CHLORIDE 9 G/1000ML
1000 INJECTION, SOLUTION INTRAVENOUS
Refills: 0 | Status: ACTIVE | OUTPATIENT
Start: 2025-08-28 | End: 2026-07-27

## 2025-08-28 RX ADMIN — Medication 50 MICROGRAM(S): at 12:00

## 2025-08-28 RX ADMIN — Medication 50 MICROGRAM(S): at 11:39

## 2025-09-03 LAB — SURGICAL PATHOLOGY STUDY: SIGNIFICANT CHANGE UP

## (undated) DEVICE — PACK D&C

## (undated) DEVICE — POSITIONER STRAP ARMBOARD VELCRO TS-30

## (undated) DEVICE — AVETA FLUID MANAGEMENT ACCESSORY

## (undated) DEVICE — TUBING IRR SET FOR CYSTOSCOPY 77"

## (undated) DEVICE — VISITEC 4X4

## (undated) DEVICE — Device

## (undated) DEVICE — GLV 7 PROTEXIS (BLUE)

## (undated) DEVICE — PROTECTOR HEEL / ELBOW FLUFFY

## (undated) DEVICE — PREP BETADINE KIT

## (undated) DEVICE — WARMING BLANKET FULL ADULT

## (undated) DEVICE — AVETA CORAL HYSTEROSCOPE 4.6MM DISP

## (undated) DEVICE — SOL IRR POUR NS 0.9% 1000ML

## (undated) DEVICE — PRESSURE INFUSOR BAG 1000ML

## (undated) DEVICE — HTA PROCEDURE SET FOR GENESYS HTA SYSTEM

## (undated) DEVICE — ACCESSORY AVETA WASTE MANAGEMENT 3MM

## (undated) DEVICE — DRSG PAD SANITARY OB

## (undated) DEVICE — LABELS BLANK W PEN

## (undated) DEVICE — VENODYNE/SCD SLEEVE CALF MEDIUM

## (undated) DEVICE — SOL IRR POUR H2O 500ML